# Patient Record
Sex: MALE | Race: BLACK OR AFRICAN AMERICAN | NOT HISPANIC OR LATINO
[De-identification: names, ages, dates, MRNs, and addresses within clinical notes are randomized per-mention and may not be internally consistent; named-entity substitution may affect disease eponyms.]

---

## 2017-03-06 ENCOUNTER — RX RENEWAL (OUTPATIENT)
Age: 52
End: 2017-03-06

## 2017-03-16 ENCOUNTER — APPOINTMENT (OUTPATIENT)
Dept: INTERNAL MEDICINE | Facility: CLINIC | Age: 52
End: 2017-03-16

## 2017-03-16 VITALS — DIASTOLIC BLOOD PRESSURE: 96 MMHG | SYSTOLIC BLOOD PRESSURE: 138 MMHG

## 2017-03-16 VITALS
HEART RATE: 79 BPM | SYSTOLIC BLOOD PRESSURE: 130 MMHG | HEIGHT: 67 IN | BODY MASS INDEX: 28.25 KG/M2 | OXYGEN SATURATION: 98 % | DIASTOLIC BLOOD PRESSURE: 70 MMHG | WEIGHT: 180 LBS

## 2017-03-16 VITALS — SYSTOLIC BLOOD PRESSURE: 132 MMHG | DIASTOLIC BLOOD PRESSURE: 98 MMHG

## 2017-03-16 DIAGNOSIS — A04.8 OTHER SPECIFIED BACTERIAL INTESTINAL INFECTIONS: ICD-10-CM

## 2017-03-16 DIAGNOSIS — S39.012A STRAIN OF MUSCLE, FASCIA AND TENDON OF LOWER BACK, INITIAL ENCOUNTER: ICD-10-CM

## 2017-03-16 DIAGNOSIS — R05 COUGH: ICD-10-CM

## 2017-03-16 DIAGNOSIS — Z87.438 PERSONAL HISTORY OF OTHER DISEASES OF MALE GENITAL ORGANS: ICD-10-CM

## 2017-03-16 DIAGNOSIS — Z87.448 PERSONAL HISTORY OF OTHER DISEASES OF URINARY SYSTEM: ICD-10-CM

## 2017-03-17 LAB
ANION GAP SERPL CALC-SCNC: 16 MMOL/L
BUN SERPL-MCNC: 13 MG/DL
CALCIUM SERPL-MCNC: 9.7 MG/DL
CHLORIDE SERPL-SCNC: 101 MMOL/L
CO2 SERPL-SCNC: 24 MMOL/L
CREAT SERPL-MCNC: 1.1 MG/DL
GLUCOSE SERPL-MCNC: 96 MG/DL
POTASSIUM SERPL-SCNC: 3.8 MMOL/L
SODIUM SERPL-SCNC: 141 MMOL/L

## 2017-04-18 ENCOUNTER — APPOINTMENT (OUTPATIENT)
Dept: INTERNAL MEDICINE | Facility: CLINIC | Age: 52
End: 2017-04-18

## 2017-05-01 ENCOUNTER — APPOINTMENT (OUTPATIENT)
Dept: INTERNAL MEDICINE | Facility: CLINIC | Age: 52
End: 2017-05-01

## 2017-05-01 VITALS
BODY MASS INDEX: 28.25 KG/M2 | OXYGEN SATURATION: 98 % | DIASTOLIC BLOOD PRESSURE: 95 MMHG | HEIGHT: 67 IN | WEIGHT: 180 LBS | SYSTOLIC BLOOD PRESSURE: 130 MMHG | HEART RATE: 72 BPM

## 2017-05-01 VITALS — SYSTOLIC BLOOD PRESSURE: 124 MMHG | DIASTOLIC BLOOD PRESSURE: 86 MMHG

## 2017-06-05 ENCOUNTER — RX RENEWAL (OUTPATIENT)
Age: 52
End: 2017-06-05

## 2017-06-16 ENCOUNTER — APPOINTMENT (OUTPATIENT)
Dept: INTERNAL MEDICINE | Facility: CLINIC | Age: 52
End: 2017-06-16

## 2017-06-16 VITALS
OXYGEN SATURATION: 98 % | DIASTOLIC BLOOD PRESSURE: 100 MMHG | WEIGHT: 178 LBS | BODY MASS INDEX: 27.94 KG/M2 | SYSTOLIC BLOOD PRESSURE: 140 MMHG | HEART RATE: 70 BPM | HEIGHT: 67 IN

## 2017-06-16 VITALS — DIASTOLIC BLOOD PRESSURE: 94 MMHG | SYSTOLIC BLOOD PRESSURE: 120 MMHG

## 2017-06-18 LAB
ANION GAP SERPL CALC-SCNC: 16 MMOL/L
BUN SERPL-MCNC: 15 MG/DL
CALCIUM SERPL-MCNC: 9.7 MG/DL
CHLORIDE SERPL-SCNC: 98 MMOL/L
CO2 SERPL-SCNC: 23 MMOL/L
CREAT SERPL-MCNC: 1.13 MG/DL
GLUCOSE SERPL-MCNC: 89 MG/DL
POTASSIUM SERPL-SCNC: 3.5 MMOL/L
SODIUM SERPL-SCNC: 137 MMOL/L

## 2017-07-11 ENCOUNTER — RX RENEWAL (OUTPATIENT)
Age: 52
End: 2017-07-11

## 2017-07-25 ENCOUNTER — APPOINTMENT (OUTPATIENT)
Dept: INTERNAL MEDICINE | Facility: CLINIC | Age: 52
End: 2017-07-25

## 2017-07-25 VITALS
HEIGHT: 67 IN | WEIGHT: 178 LBS | HEART RATE: 76 BPM | BODY MASS INDEX: 27.94 KG/M2 | SYSTOLIC BLOOD PRESSURE: 120 MMHG | DIASTOLIC BLOOD PRESSURE: 80 MMHG | OXYGEN SATURATION: 98 %

## 2017-07-26 LAB
25(OH)D3 SERPL-MCNC: 37.8 NG/ML
ALBUMIN SERPL ELPH-MCNC: 4.5 G/DL
ALP BLD-CCNC: 74 U/L
ALT SERPL-CCNC: 60 U/L
ANION GAP SERPL CALC-SCNC: 14 MMOL/L
AST SERPL-CCNC: 38 U/L
BASOPHILS # BLD AUTO: 0.01 K/UL
BASOPHILS NFR BLD AUTO: 0.2 %
BILIRUB SERPL-MCNC: 0.3 MG/DL
BUN SERPL-MCNC: 15 MG/DL
CALCIUM SERPL-MCNC: 9.8 MG/DL
CHLORIDE SERPL-SCNC: 100 MMOL/L
CHOLEST SERPL-MCNC: 168 MG/DL
CHOLEST/HDLC SERPL: 3.8 RATIO
CO2 SERPL-SCNC: 26 MMOL/L
CREAT SERPL-MCNC: 1.04 MG/DL
EOSINOPHIL # BLD AUTO: 0.15 K/UL
EOSINOPHIL NFR BLD AUTO: 2.5 %
GLUCOSE SERPL-MCNC: 86 MG/DL
HBA1C MFR BLD HPLC: 5.7 %
HCT VFR BLD CALC: 44.2 %
HDLC SERPL-MCNC: 44 MG/DL
HGB BLD-MCNC: 14.9 G/DL
IMM GRANULOCYTES NFR BLD AUTO: 0.2 %
LDLC SERPL CALC-MCNC: 74 MG/DL
LYMPHOCYTES # BLD AUTO: 1.89 K/UL
LYMPHOCYTES NFR BLD AUTO: 31.1 %
MAN DIFF?: NORMAL
MCHC RBC-ENTMCNC: 30.2 PG
MCHC RBC-ENTMCNC: 33.7 GM/DL
MCV RBC AUTO: 89.5 FL
MONOCYTES # BLD AUTO: 0.72 K/UL
MONOCYTES NFR BLD AUTO: 11.8 %
NEUTROPHILS # BLD AUTO: 3.3 K/UL
NEUTROPHILS NFR BLD AUTO: 54.2 %
PLATELET # BLD AUTO: 233 K/UL
POTASSIUM SERPL-SCNC: 4.1 MMOL/L
PROT SERPL-MCNC: 7.8 G/DL
RBC # BLD: 4.94 M/UL
RBC # FLD: 13.6 %
SODIUM SERPL-SCNC: 140 MMOL/L
TRIGL SERPL-MCNC: 250 MG/DL
WBC # FLD AUTO: 6.08 K/UL

## 2017-08-08 ENCOUNTER — APPOINTMENT (OUTPATIENT)
Dept: PHYSICAL MEDICINE AND REHAB | Facility: CLINIC | Age: 52
End: 2017-08-08
Payer: COMMERCIAL

## 2017-08-08 VITALS
TEMPERATURE: 98 F | HEART RATE: 71 BPM | DIASTOLIC BLOOD PRESSURE: 96 MMHG | OXYGEN SATURATION: 96 % | SYSTOLIC BLOOD PRESSURE: 149 MMHG

## 2017-08-08 PROCEDURE — 99214 OFFICE O/P EST MOD 30 MIN: CPT

## 2017-08-28 ENCOUNTER — RX RENEWAL (OUTPATIENT)
Age: 52
End: 2017-08-28

## 2017-09-19 ENCOUNTER — APPOINTMENT (OUTPATIENT)
Dept: PHYSICAL MEDICINE AND REHAB | Facility: CLINIC | Age: 52
End: 2017-09-19
Payer: COMMERCIAL

## 2017-09-19 VITALS
SYSTOLIC BLOOD PRESSURE: 135 MMHG | DIASTOLIC BLOOD PRESSURE: 89 MMHG | TEMPERATURE: 97.7 F | OXYGEN SATURATION: 98 % | HEART RATE: 64 BPM

## 2017-09-19 PROCEDURE — 99213 OFFICE O/P EST LOW 20 MIN: CPT

## 2017-10-06 ENCOUNTER — APPOINTMENT (OUTPATIENT)
Dept: INTERNAL MEDICINE | Facility: CLINIC | Age: 52
End: 2017-10-06

## 2018-04-27 ENCOUNTER — APPOINTMENT (OUTPATIENT)
Dept: INTERNAL MEDICINE | Facility: CLINIC | Age: 53
End: 2018-04-27
Payer: COMMERCIAL

## 2018-04-27 VITALS — SYSTOLIC BLOOD PRESSURE: 140 MMHG | DIASTOLIC BLOOD PRESSURE: 80 MMHG

## 2018-04-27 VITALS
WEIGHT: 178 LBS | DIASTOLIC BLOOD PRESSURE: 80 MMHG | HEART RATE: 73 BPM | BODY MASS INDEX: 27.94 KG/M2 | SYSTOLIC BLOOD PRESSURE: 120 MMHG | HEIGHT: 67 IN | OXYGEN SATURATION: 98 %

## 2018-04-27 PROCEDURE — 99214 OFFICE O/P EST MOD 30 MIN: CPT

## 2018-04-27 RX ORDER — CYCLOBENZAPRINE HYDROCHLORIDE 10 MG/1
10 TABLET, FILM COATED ORAL
Qty: 10 | Refills: 0 | Status: COMPLETED | COMMUNITY
End: 2017-04-27

## 2018-04-30 LAB
25(OH)D3 SERPL-MCNC: 37.2 NG/ML
ALBUMIN SERPL ELPH-MCNC: 4.3 G/DL
ALP BLD-CCNC: 89 U/L
ALT SERPL-CCNC: 37 U/L
ANION GAP SERPL CALC-SCNC: 16 MMOL/L
AST SERPL-CCNC: 26 U/L
BASOPHILS # BLD AUTO: 0.03 K/UL
BASOPHILS NFR BLD AUTO: 0.4 %
BILIRUB SERPL-MCNC: 0.4 MG/DL
BUN SERPL-MCNC: 13 MG/DL
CALCIUM SERPL-MCNC: 9.9 MG/DL
CHLORIDE SERPL-SCNC: 99 MMOL/L
CHOLEST SERPL-MCNC: 163 MG/DL
CHOLEST/HDLC SERPL: 3.5 RATIO
CO2 SERPL-SCNC: 25 MMOL/L
CREAT SERPL-MCNC: 1.11 MG/DL
EOSINOPHIL # BLD AUTO: 0.13 K/UL
EOSINOPHIL NFR BLD AUTO: 1.7 %
GLUCOSE SERPL-MCNC: 91 MG/DL
HBA1C MFR BLD HPLC: 5.8 %
HCT VFR BLD CALC: 45.5 %
HCV AB SER QL: NONREACTIVE
HCV S/CO RATIO: 0.11 S/CO
HDLC SERPL-MCNC: 46 MG/DL
HGB BLD-MCNC: 15.1 G/DL
IMM GRANULOCYTES NFR BLD AUTO: 0.3 %
LDLC SERPL CALC-MCNC: 96 MG/DL
LYMPHOCYTES # BLD AUTO: 1.75 K/UL
LYMPHOCYTES NFR BLD AUTO: 22.3 %
MAN DIFF?: NORMAL
MCHC RBC-ENTMCNC: 30.1 PG
MCHC RBC-ENTMCNC: 33.2 GM/DL
MCV RBC AUTO: 90.8 FL
MONOCYTES # BLD AUTO: 0.92 K/UL
MONOCYTES NFR BLD AUTO: 11.7 %
NEUTROPHILS # BLD AUTO: 5 K/UL
NEUTROPHILS NFR BLD AUTO: 63.6 %
PLATELET # BLD AUTO: 233 K/UL
POTASSIUM SERPL-SCNC: 3.6 MMOL/L
PROT SERPL-MCNC: 8.1 G/DL
RBC # BLD: 5.01 M/UL
RBC # FLD: 12.7 %
SODIUM SERPL-SCNC: 140 MMOL/L
TRIGL SERPL-MCNC: 105 MG/DL
WBC # FLD AUTO: 7.85 K/UL

## 2018-05-03 ENCOUNTER — APPOINTMENT (OUTPATIENT)
Dept: SURGERY | Facility: CLINIC | Age: 53
End: 2018-05-03

## 2018-06-01 ENCOUNTER — RX RENEWAL (OUTPATIENT)
Age: 53
End: 2018-06-01

## 2018-06-01 ENCOUNTER — TRANSCRIPTION ENCOUNTER (OUTPATIENT)
Age: 53
End: 2018-06-01

## 2018-06-02 ENCOUNTER — EMERGENCY (EMERGENCY)
Facility: HOSPITAL | Age: 53
LOS: 1 days | Discharge: HOME | End: 2018-06-02
Attending: EMERGENCY MEDICINE
Payer: COMMERCIAL

## 2018-06-02 VITALS
TEMPERATURE: 98 F | RESPIRATION RATE: 18 BRPM | HEART RATE: 87 BPM | OXYGEN SATURATION: 96 % | SYSTOLIC BLOOD PRESSURE: 163 MMHG | WEIGHT: 160.06 LBS | DIASTOLIC BLOOD PRESSURE: 124 MMHG

## 2018-06-02 VITALS
DIASTOLIC BLOOD PRESSURE: 88 MMHG | HEART RATE: 88 BPM | SYSTOLIC BLOOD PRESSURE: 144 MMHG | OXYGEN SATURATION: 100 % | RESPIRATION RATE: 19 BRPM | TEMPERATURE: 98 F

## 2018-06-02 LAB
APPEARANCE UR: CLEAR — SIGNIFICANT CHANGE UP
BILIRUB UR-MCNC: NEGATIVE — SIGNIFICANT CHANGE UP
COLOR SPEC: YELLOW — SIGNIFICANT CHANGE UP
DIFF PNL FLD: NEGATIVE — SIGNIFICANT CHANGE UP
GLUCOSE UR QL: NEGATIVE — SIGNIFICANT CHANGE UP
HYALINE CASTS # UR AUTO: ABNORMAL
KETONES UR-MCNC: NEGATIVE — SIGNIFICANT CHANGE UP
LEUKOCYTE ESTERASE UR-ACNC: NEGATIVE — SIGNIFICANT CHANGE UP
NITRITE UR-MCNC: NEGATIVE — SIGNIFICANT CHANGE UP
PH UR: 6 — SIGNIFICANT CHANGE UP (ref 5–8)
PROT UR-MCNC: SIGNIFICANT CHANGE UP
RBC CASTS # UR COMP ASSIST: SIGNIFICANT CHANGE UP /HPF (ref 0–2)
SP GR SPEC: 1.03 — HIGH (ref 1.01–1.02)
UROBILINOGEN FLD QL: NEGATIVE — SIGNIFICANT CHANGE UP
WBC UR QL: SIGNIFICANT CHANGE UP /HPF (ref 0–5)

## 2018-06-02 PROCEDURE — 74176 CT ABD & PELVIS W/O CONTRAST: CPT | Mod: 26

## 2018-06-02 PROCEDURE — 81001 URINALYSIS AUTO W/SCOPE: CPT

## 2018-06-02 PROCEDURE — 99283 EMERGENCY DEPT VISIT LOW MDM: CPT

## 2018-06-02 PROCEDURE — 74176 CT ABD & PELVIS W/O CONTRAST: CPT

## 2018-06-02 RX ORDER — LIDOCAINE 4 G/100G
1 CREAM TOPICAL ONCE
Qty: 0 | Refills: 0 | Status: COMPLETED | OUTPATIENT
Start: 2018-06-02 | End: 2018-06-02

## 2018-06-02 RX ORDER — ACETAMINOPHEN 500 MG
650 TABLET ORAL ONCE
Qty: 0 | Refills: 0 | Status: COMPLETED | OUTPATIENT
Start: 2018-06-02 | End: 2018-06-02

## 2018-06-02 RX ORDER — IBUPROFEN 200 MG
1 TABLET ORAL
Qty: 28 | Refills: 0
Start: 2018-06-02 | End: 2018-06-08

## 2018-06-02 RX ORDER — IBUPROFEN 200 MG
600 TABLET ORAL ONCE
Qty: 0 | Refills: 0 | Status: COMPLETED | OUTPATIENT
Start: 2018-06-02 | End: 2018-06-02

## 2018-06-02 RX ORDER — DIAZEPAM 5 MG
5 TABLET ORAL ONCE
Qty: 0 | Refills: 0 | Status: DISCONTINUED | OUTPATIENT
Start: 2018-06-02 | End: 2018-06-02

## 2018-06-02 RX ORDER — OXYCODONE HYDROCHLORIDE 5 MG/1
1 TABLET ORAL
Qty: 12 | Refills: 0
Start: 2018-06-02 | End: 2018-06-04

## 2018-06-02 RX ORDER — OXYCODONE HYDROCHLORIDE 5 MG/1
5 TABLET ORAL ONCE
Qty: 0 | Refills: 0 | Status: DISCONTINUED | OUTPATIENT
Start: 2018-06-02 | End: 2018-06-02

## 2018-06-02 RX ADMIN — LIDOCAINE 1 PATCH: 4 CREAM TOPICAL at 12:42

## 2018-06-02 RX ADMIN — Medication 600 MILLIGRAM(S): at 12:42

## 2018-06-02 RX ADMIN — Medication 5 MILLIGRAM(S): at 12:42

## 2018-06-02 RX ADMIN — Medication 650 MILLIGRAM(S): at 12:42

## 2018-06-02 RX ADMIN — OXYCODONE HYDROCHLORIDE 5 MILLIGRAM(S): 5 TABLET ORAL at 13:33

## 2018-06-02 NOTE — ED ADULT NURSE NOTE - OBJECTIVE STATEMENT
53 yo male presents in the ed for back pain. Pt is alert and oriented x4, speaking coherently. Pt states that he has had back pain on and off for "years" never officially diagnosed by MRI- pt has had trouble ambulating and getting up out of a chair. Lungs clear to ascultation, abdomen soft, non tender. Pt denies urinary symptoms- MD at bedside, will continue to reassess.

## 2018-06-02 NOTE — ED PROVIDER NOTE - ATTENDING CONTRIBUTION TO CARE
L flank pain after moving.  No numbness / tingling / urinary incont or retention / bowel probs / ivdu / fevers.   no skin changes. no midline ttp. +ttp L flank  given the ttp is lateral to paraspinal this is not kemar typical for msk back pain, low threshold for further imaging.

## 2018-06-02 NOTE — ED PROVIDER NOTE - CARE PLAN
Principal Discharge DX:	Acute left-sided low back pain without sciatica Principal Discharge DX:	Acute left-sided low back pain without sciatica  Assessment and plan of treatment:	1) Follow up with your doctor in 3 days. Call for an appointment.   2) Return to the ER immediately for new or worsening symptoms including fevers, chills vomiting or other issues.   3) Take Ibuprofen 600-800mg every 6 hours as needed for pain.   4) Take oxycodone 5mg every 6 hours as needed for severe pain. Do not drink alcohol and do no drive while taking this medication.

## 2018-06-02 NOTE — ED PROVIDER NOTE - OBJECTIVE STATEMENT
52yoM hx of back pain, pw left sided back pain after twisting wrong 2 days ago, felt tensing in his back, and then severe pain, radiating to his left side and buttock. pain 8/10, waxing and waning, feels stiff like he cannot move. has had this before, feels the same as his normal back pain. denies trauma, iv drug use, back instrumentation, travel, alcohol abuse or other issues.

## 2018-06-02 NOTE — ED PROVIDER NOTE - PLAN OF CARE
1) Follow up with your doctor in 3 days. Call for an appointment.   2) Return to the ER immediately for new or worsening symptoms including fevers, chills vomiting or other issues.   3) Take Ibuprofen 600-800mg every 6 hours as needed for pain.   4) Take oxycodone 5mg every 6 hours as needed for severe pain. Do not drink alcohol and do no drive while taking this medication.

## 2018-06-02 NOTE — ED PROVIDER NOTE - MEDICAL DECISION MAKING DETAILS
52yoM pw left sided back pain, after twisting. CT from 2013 shows normal caliber aorta. no indication to repeat scan at this time. will check urine for blood and give pain meds. if blood in urine, will obtain ct to rule out nephrolithiasis, otherwise will treat as back pain, msk.

## 2018-06-02 NOTE — ED PROVIDER NOTE - PROGRESS NOTE DETAILS
pain not improved, will ct for stone or abdominal pathology. pain now improved. ambulating independently. will send prescription and arrange follow up with ortho and dc home.

## 2018-06-04 ENCOUNTER — APPOINTMENT (OUTPATIENT)
Dept: MRI IMAGING | Facility: IMAGING CENTER | Age: 53
End: 2018-06-04

## 2018-06-04 RX ORDER — METAXALONE 800 MG/1
800 TABLET ORAL EVERY 8 HOURS
Qty: 30 | Refills: 1 | Status: DISCONTINUED | COMMUNITY
Start: 2018-06-01 | End: 2018-06-04

## 2018-06-04 RX ORDER — DICLOFENAC SODIUM 75 MG/1
75 TABLET, DELAYED RELEASE ORAL
Qty: 60 | Refills: 1 | Status: DISCONTINUED | COMMUNITY
Start: 2017-07-11 | End: 2018-06-04

## 2018-06-09 ENCOUNTER — APPOINTMENT (OUTPATIENT)
Dept: MRI IMAGING | Facility: CLINIC | Age: 53
End: 2018-06-09

## 2018-06-19 ENCOUNTER — RX RENEWAL (OUTPATIENT)
Age: 53
End: 2018-06-19

## 2018-07-10 ENCOUNTER — APPOINTMENT (OUTPATIENT)
Dept: PHYSICAL MEDICINE AND REHAB | Facility: CLINIC | Age: 53
End: 2018-07-10
Payer: COMMERCIAL

## 2018-07-10 VITALS — HEART RATE: 76 BPM | SYSTOLIC BLOOD PRESSURE: 175 MMHG | DIASTOLIC BLOOD PRESSURE: 114 MMHG | OXYGEN SATURATION: 99 %

## 2018-07-10 PROCEDURE — 99213 OFFICE O/P EST LOW 20 MIN: CPT

## 2018-07-27 ENCOUNTER — APPOINTMENT (OUTPATIENT)
Dept: INTERNAL MEDICINE | Facility: CLINIC | Age: 53
End: 2018-07-27
Payer: COMMERCIAL

## 2018-07-27 VITALS
SYSTOLIC BLOOD PRESSURE: 160 MMHG | BODY MASS INDEX: 27.62 KG/M2 | OXYGEN SATURATION: 98 % | HEART RATE: 92 BPM | HEIGHT: 67 IN | DIASTOLIC BLOOD PRESSURE: 112 MMHG | WEIGHT: 176 LBS | TEMPERATURE: 97.9 F

## 2018-07-27 VITALS — DIASTOLIC BLOOD PRESSURE: 100 MMHG | SYSTOLIC BLOOD PRESSURE: 150 MMHG

## 2018-07-27 PROCEDURE — 99396 PREV VISIT EST AGE 40-64: CPT

## 2018-07-27 RX ORDER — PREDNISONE 20 MG/1
20 TABLET ORAL
Qty: 20 | Refills: 0 | Status: COMPLETED | COMMUNITY
Start: 2018-07-27 | End: 2018-08-04

## 2018-07-27 NOTE — HEALTH RISK ASSESSMENT
[Good] : ~his/her~ current health as good [Very Good] : ~his/her~  mood as very good [Intercurrent ED visits] : went to ED [No falls in past year] : Patient reported no falls in the past year [Transportation] : transportation [Employed] : employed [High School] : high school [Single] : single [Feels Safe at Home] : Feels safe at home [Fully functional (bathing, dressing, toileting, transferring, walking, feeding)] : Fully functional (bathing, dressing, toileting, transferring, walking, feeding) [Fully functional (using the telephone, shopping, preparing meals, housekeeping, doing laundry, using] : Fully functional and needs no help or supervision to perform IADLs (using the telephone, shopping, preparing meals, housekeeping, doing laundry, using transportation, managing medications and managing finances) [] : No [Reports changes in hearing] : Reports no changes in hearing [Reports changes in vision] : Reports no changes in vision [Reports changes in dental health] : Reports no changes in dental health

## 2018-08-11 ENCOUNTER — RX RENEWAL (OUTPATIENT)
Age: 53
End: 2018-08-11

## 2018-09-04 ENCOUNTER — APPOINTMENT (OUTPATIENT)
Dept: INTERNAL MEDICINE | Facility: CLINIC | Age: 53
End: 2018-09-04
Payer: COMMERCIAL

## 2018-09-04 VITALS — SYSTOLIC BLOOD PRESSURE: 140 MMHG | DIASTOLIC BLOOD PRESSURE: 94 MMHG

## 2018-09-04 VITALS
WEIGHT: 178 LBS | HEIGHT: 67 IN | OXYGEN SATURATION: 98 % | BODY MASS INDEX: 27.94 KG/M2 | TEMPERATURE: 98.2 F | HEART RATE: 90 BPM | SYSTOLIC BLOOD PRESSURE: 132 MMHG | DIASTOLIC BLOOD PRESSURE: 94 MMHG

## 2018-09-04 PROCEDURE — 99214 OFFICE O/P EST MOD 30 MIN: CPT

## 2018-09-04 RX ORDER — CYCLOBENZAPRINE HYDROCHLORIDE 10 MG/1
10 TABLET, FILM COATED ORAL 3 TIMES DAILY
Qty: 30 | Refills: 3 | Status: COMPLETED | COMMUNITY
Start: 2018-06-04 | End: 2018-07-31

## 2018-09-04 NOTE — HISTORY OF PRESENT ILLNESS
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 Assessed by [facility] may only do Light Duty Work and sent for physical therapy\par \par Stopped the HCTZ on his own [id number] when I added the amlodipine\par Getting PT and still working\par Disc herniation seen on MRI  About to do disability online\par \par The meloxicam and cyclobenzaprine did not helpo. Finally the Prednisone helped decrease his back pain\par \par L arm tingling last 2 days

## 2018-09-04 NOTE — REVIEW OF SYSTEMS
[Fever] : no fever [Chills] : no chills [Chest Pain] : no chest pain [Palpitations] : no palpitations [Cough] : no cough

## 2018-10-18 ENCOUNTER — RX RENEWAL (OUTPATIENT)
Age: 53
End: 2018-10-18

## 2018-12-04 ENCOUNTER — APPOINTMENT (OUTPATIENT)
Dept: INTERNAL MEDICINE | Facility: CLINIC | Age: 53
End: 2018-12-04

## 2019-01-17 ENCOUNTER — RX RENEWAL (OUTPATIENT)
Age: 54
End: 2019-01-17

## 2019-02-04 ENCOUNTER — APPOINTMENT (OUTPATIENT)
Dept: INTERNAL MEDICINE | Facility: CLINIC | Age: 54
End: 2019-02-04
Payer: COMMERCIAL

## 2019-02-04 VITALS
OXYGEN SATURATION: 97 % | WEIGHT: 177 LBS | DIASTOLIC BLOOD PRESSURE: 80 MMHG | TEMPERATURE: 97.7 F | HEIGHT: 67 IN | BODY MASS INDEX: 27.78 KG/M2 | SYSTOLIC BLOOD PRESSURE: 141 MMHG | HEART RATE: 88 BPM

## 2019-02-04 VITALS — SYSTOLIC BLOOD PRESSURE: 122 MMHG | DIASTOLIC BLOOD PRESSURE: 86 MMHG

## 2019-02-04 DIAGNOSIS — Z87.2 PERSONAL HISTORY OF DISEASES OF THE SKIN AND SUBCUTANEOUS TISSUE: ICD-10-CM

## 2019-02-04 DIAGNOSIS — S39.012D STRAIN OF MUSCLE, FASCIA AND TENDON OF LOWER BACK, SUBSEQUENT ENCOUNTER: ICD-10-CM

## 2019-02-04 DIAGNOSIS — M62.830 MUSCLE SPASM OF BACK: ICD-10-CM

## 2019-02-04 DIAGNOSIS — Z87.898 PERSONAL HISTORY OF OTHER SPECIFIED CONDITIONS: ICD-10-CM

## 2019-02-04 PROCEDURE — 99214 OFFICE O/P EST MOD 30 MIN: CPT

## 2019-02-04 NOTE — HISTORY OF PRESENT ILLNESS
[FreeTextEntry1] : Back Pain  HTN [de-identified] : 52 yo B male HTN\par \par Herniated disc finally helped by prednisone\par Sent to [facility]\par Stopped HCTZ in the past\par Takes a bus and walks up the 1/4+mile steep hill to get here\par \par HA with felodipine 5mg\par Took meloxicam today

## 2019-02-05 LAB
ANION GAP SERPL CALC-SCNC: 13 MMOL/L
BUN SERPL-MCNC: 14 MG/DL
CALCIUM SERPL-MCNC: 9.6 MG/DL
CHLORIDE SERPL-SCNC: 102 MMOL/L
CO2 SERPL-SCNC: 28 MMOL/L
CREAT SERPL-MCNC: 1.13 MG/DL
CREAT SPEC-SCNC: 159 MG/DL
CREAT/PROT UR: 0.1 RATIO
GLUCOSE SERPL-MCNC: 102 MG/DL
POTASSIUM SERPL-SCNC: 3.5 MMOL/L
PROT UR-MCNC: 15 MG/DL
SODIUM SERPL-SCNC: 143 MMOL/L

## 2019-02-13 ENCOUNTER — TRANSCRIPTION ENCOUNTER (OUTPATIENT)
Age: 54
End: 2019-02-13

## 2019-03-09 ENCOUNTER — TRANSCRIPTION ENCOUNTER (OUTPATIENT)
Age: 54
End: 2019-03-09

## 2019-03-21 ENCOUNTER — TRANSCRIPTION ENCOUNTER (OUTPATIENT)
Age: 54
End: 2019-03-21

## 2019-03-22 ENCOUNTER — TRANSCRIPTION ENCOUNTER (OUTPATIENT)
Age: 54
End: 2019-03-22

## 2019-03-25 ENCOUNTER — APPOINTMENT (OUTPATIENT)
Dept: INTERNAL MEDICINE | Facility: CLINIC | Age: 54
End: 2019-03-25
Payer: COMMERCIAL

## 2019-03-25 VITALS
SYSTOLIC BLOOD PRESSURE: 138 MMHG | TEMPERATURE: 97.7 F | OXYGEN SATURATION: 97 % | HEIGHT: 67 IN | WEIGHT: 178 LBS | HEART RATE: 99 BPM | DIASTOLIC BLOOD PRESSURE: 76 MMHG | BODY MASS INDEX: 27.94 KG/M2

## 2019-03-25 VITALS — DIASTOLIC BLOOD PRESSURE: 74 MMHG | SYSTOLIC BLOOD PRESSURE: 112 MMHG

## 2019-03-25 PROCEDURE — 99214 OFFICE O/P EST MOD 30 MIN: CPT

## 2019-03-25 NOTE — HISTORY OF PRESENT ILLNESS
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 In the future will have scanned instead of lifting records. Will be out [id number] to [id number] to avoid lifting. Working at this point\par \par HA with 5mg felodipine  so 2.5mg amlodipine BID

## 2019-04-30 ENCOUNTER — RX RENEWAL (OUTPATIENT)
Age: 54
End: 2019-04-30

## 2019-05-13 ENCOUNTER — TRANSCRIPTION ENCOUNTER (OUTPATIENT)
Age: 54
End: 2019-05-13

## 2019-05-28 ENCOUNTER — RX RENEWAL (OUTPATIENT)
Age: 54
End: 2019-05-28

## 2019-05-28 ENCOUNTER — TRANSCRIPTION ENCOUNTER (OUTPATIENT)
Age: 54
End: 2019-05-28

## 2019-06-06 ENCOUNTER — TRANSCRIPTION ENCOUNTER (OUTPATIENT)
Age: 54
End: 2019-06-06

## 2019-06-10 ENCOUNTER — TRANSCRIPTION ENCOUNTER (OUTPATIENT)
Age: 54
End: 2019-06-10

## 2019-07-29 ENCOUNTER — APPOINTMENT (OUTPATIENT)
Dept: INTERNAL MEDICINE | Facility: CLINIC | Age: 54
End: 2019-07-29
Payer: COMMERCIAL

## 2019-07-29 VITALS
DIASTOLIC BLOOD PRESSURE: 80 MMHG | OXYGEN SATURATION: 98 % | HEIGHT: 67 IN | BODY MASS INDEX: 28.41 KG/M2 | WEIGHT: 181 LBS | SYSTOLIC BLOOD PRESSURE: 130 MMHG | HEART RATE: 83 BPM | TEMPERATURE: 98.2 F

## 2019-07-29 VITALS — DIASTOLIC BLOOD PRESSURE: 80 MMHG | SYSTOLIC BLOOD PRESSURE: 122 MMHG

## 2019-07-29 PROCEDURE — 99214 OFFICE O/P EST MOD 30 MIN: CPT | Mod: 25

## 2019-07-29 PROCEDURE — 99396 PREV VISIT EST AGE 40-64: CPT

## 2019-07-29 RX ORDER — AMLODIPINE BESYLATE 2.5 MG/1
2.5 TABLET ORAL
Qty: 90 | Refills: 3 | Status: COMPLETED | COMMUNITY
Start: 2018-07-27 | End: 2019-07-29

## 2019-07-29 RX ORDER — CYCLOBENZAPRINE HYDROCHLORIDE 10 MG/1
10 TABLET, FILM COATED ORAL
Qty: 6 | Refills: 0 | Status: COMPLETED | COMMUNITY
Start: 2019-07-29 | End: 2019-08-04

## 2019-07-29 NOTE — PHYSICAL EXAM
[No Acute Distress] : no acute distress [Well Nourished] : well nourished [Well-Appearing] : well-appearing [Well Developed] : well developed [Normal Sclera/Conjunctiva] : normal sclera/conjunctiva [EOMI] : extraocular movements intact [PERRL] : pupils equal round and reactive to light [Normal Outer Ear/Nose] : the outer ears and nose were normal in appearance [Normal Oropharynx] : the oropharynx was normal [No JVD] : no jugular venous distention [Supple] : supple [No Lymphadenopathy] : no lymphadenopathy [Thyroid Normal, No Nodules] : the thyroid was normal and there were no nodules present [No Respiratory Distress] : no respiratory distress  [Clear to Auscultation] : lungs were clear to auscultation bilaterally [No Accessory Muscle Use] : no accessory muscle use [Normal Rate] : normal rate  [Regular Rhythm] : with a regular rhythm [Normal S1, S2] : normal S1 and S2 [No Murmur] : no murmur heard [No Carotid Bruits] : no carotid bruits [No Abdominal Bruit] : a ~M bruit was not heard ~T in the abdomen [No Varicosities] : no varicosities [Pedal Pulses Present] : the pedal pulses are present [No Edema] : there was no peripheral edema [No Extremity Clubbing/Cyanosis] : no extremity clubbing/cyanosis [No Palpable Aorta] : no palpable aorta [Soft] : abdomen soft [Non Tender] : non-tender [Non-distended] : non-distended [No Masses] : no abdominal mass palpated [No HSM] : no HSM [Normal Bowel Sounds] : normal bowel sounds [Normal Posterior Cervical Nodes] : no posterior cervical lymphadenopathy [Normal Anterior Cervical Nodes] : no anterior cervical lymphadenopathy [No CVA Tenderness] : no CVA  tenderness [No Spinal Tenderness] : no spinal tenderness [No Joint Swelling] : no joint swelling [Grossly Normal Strength/Tone] : grossly normal strength/tone [No Rash] : no rash [Normal Gait] : normal gait [Coordination Grossly Intact] : coordination grossly intact [No Focal Deficits] : no focal deficits [Deep Tendon Reflexes (DTR)] : deep tendon reflexes were 2+ and symmetric [Normal Affect] : the affect was normal [Normal Insight/Judgement] : insight and judgment were intact

## 2019-07-30 LAB
ANION GAP SERPL CALC-SCNC: 15 MMOL/L
BUN SERPL-MCNC: 13 MG/DL
CALCIUM SERPL-MCNC: 9.8 MG/DL
CHLORIDE SERPL-SCNC: 99 MMOL/L
CHOLEST SERPL-MCNC: 172 MG/DL
CHOLEST/HDLC SERPL: 3.8 RATIO
CO2 SERPL-SCNC: 28 MMOL/L
CREAT SERPL-MCNC: 1.29 MG/DL
GLUCOSE SERPL-MCNC: 72 MG/DL
HDLC SERPL-MCNC: 45 MG/DL
HIV1+2 AB SPEC QL IA.RAPID: NONREACTIVE
LDLC SERPL CALC-MCNC: 89 MG/DL
POTASSIUM SERPL-SCNC: 3.2 MMOL/L
SODIUM SERPL-SCNC: 142 MMOL/L
TRIGL SERPL-MCNC: 189 MG/DL

## 2019-07-31 ENCOUNTER — TRANSCRIPTION ENCOUNTER (OUTPATIENT)
Age: 54
End: 2019-07-31

## 2019-08-09 DIAGNOSIS — S29.012A STRAIN OF MUSCLE AND TENDON OF BACK WALL OF THORAX, INITIAL ENCOUNTER: ICD-10-CM

## 2019-08-09 DIAGNOSIS — M54.32 SCIATICA, LEFT SIDE: ICD-10-CM

## 2019-08-19 ENCOUNTER — APPOINTMENT (OUTPATIENT)
Dept: PHYSICAL MEDICINE AND REHAB | Facility: CLINIC | Age: 54
End: 2019-08-19
Payer: COMMERCIAL

## 2019-08-19 VITALS
OXYGEN SATURATION: 98 % | SYSTOLIC BLOOD PRESSURE: 133 MMHG | HEART RATE: 68 BPM | TEMPERATURE: 98.4 F | DIASTOLIC BLOOD PRESSURE: 90 MMHG

## 2019-08-19 PROCEDURE — 99214 OFFICE O/P EST MOD 30 MIN: CPT

## 2019-08-19 NOTE — HISTORY OF PRESENT ILLNESS
[FreeTextEntry1] : Patient has not been seen in a year.\iman Still with chronic throbbing low back pain- goes across the back. \par Gets throbbing into the left thigh. \iman Takes meloxicam occasionally if pain is bad and started cyclobenzaprine which helps the throbbing. \iman Had PT most recently a few months ago. \iman Has been on short-term disability for the past three months due his severe low back pain.

## 2019-08-19 NOTE — ASSESSMENT
[FreeTextEntry1] : - Referred to PT\par - Refer to Interventional spine specialist\par - Discussed with patient red flags such as bladder/bowel incontinence, weakness, significant increase in pain level in which case patient should immediately present to Emergency Room.\par - Continue meloxicam and cyclobenzaprine.

## 2019-08-19 NOTE — PHYSICAL EXAM
[Normal] : Normal skin color and pigmentation, normal turgor and no rash [de-identified] : Pain extension of lumbar spine.  [de-identified] : 1+ L patella, R patella and bl achilles 2+, neg SLR, motor 5/5 bl LEs [de-identified] : decreased extension of lumbar spine.

## 2019-09-23 ENCOUNTER — RX RENEWAL (OUTPATIENT)
Age: 54
End: 2019-09-23

## 2019-09-23 ENCOUNTER — TRANSCRIPTION ENCOUNTER (OUTPATIENT)
Age: 54
End: 2019-09-23

## 2019-09-23 RX ORDER — OXYCODONE 5 MG/1
5 TABLET ORAL 3 TIMES DAILY
Qty: 30 | Refills: 0 | Status: COMPLETED | COMMUNITY
Start: 2019-09-23 | End: 2019-10-03

## 2019-09-23 RX ORDER — OXYCODONE 5 MG/1
5 TABLET ORAL 3 TIMES DAILY
Qty: 30 | Refills: 0 | Status: COMPLETED | COMMUNITY
Start: 2019-09-23 | End: 2019-09-23

## 2019-09-26 ENCOUNTER — EMERGENCY (EMERGENCY)
Facility: HOSPITAL | Age: 54
LOS: 1 days | Discharge: HOME | End: 2019-09-26
Attending: EMERGENCY MEDICINE
Payer: COMMERCIAL

## 2019-09-26 VITALS
TEMPERATURE: 98 F | RESPIRATION RATE: 16 BRPM | SYSTOLIC BLOOD PRESSURE: 136 MMHG | HEART RATE: 87 BPM | DIASTOLIC BLOOD PRESSURE: 87 MMHG | OXYGEN SATURATION: 98 %

## 2019-09-26 VITALS
SYSTOLIC BLOOD PRESSURE: 125 MMHG | TEMPERATURE: 98 F | HEIGHT: 67 IN | WEIGHT: 160.06 LBS | OXYGEN SATURATION: 97 % | HEART RATE: 120 BPM | DIASTOLIC BLOOD PRESSURE: 83 MMHG | RESPIRATION RATE: 17 BRPM

## 2019-09-26 LAB
APPEARANCE UR: CLEAR — SIGNIFICANT CHANGE UP
BACTERIA # UR AUTO: NEGATIVE — SIGNIFICANT CHANGE UP
BILIRUB UR-MCNC: NEGATIVE — SIGNIFICANT CHANGE UP
COLOR SPEC: YELLOW — SIGNIFICANT CHANGE UP
DIFF PNL FLD: NEGATIVE — SIGNIFICANT CHANGE UP
EPI CELLS # UR: 0 /HPF — SIGNIFICANT CHANGE UP
GLUCOSE UR QL: NEGATIVE — SIGNIFICANT CHANGE UP
HYALINE CASTS # UR AUTO: 3 /LPF — HIGH (ref 0–2)
KETONES UR-MCNC: NEGATIVE — SIGNIFICANT CHANGE UP
LEUKOCYTE ESTERASE UR-ACNC: NEGATIVE — SIGNIFICANT CHANGE UP
NITRITE UR-MCNC: NEGATIVE — SIGNIFICANT CHANGE UP
PH UR: 6 — SIGNIFICANT CHANGE UP (ref 5–8)
PROT UR-MCNC: ABNORMAL
RBC CASTS # UR COMP ASSIST: 1 /HPF — SIGNIFICANT CHANGE UP (ref 0–4)
SP GR SPEC: 1.02 — SIGNIFICANT CHANGE UP (ref 1.01–1.02)
UROBILINOGEN FLD QL: NEGATIVE — SIGNIFICANT CHANGE UP
WBC UR QL: 3 /HPF — SIGNIFICANT CHANGE UP (ref 0–5)

## 2019-09-26 PROCEDURE — 99283 EMERGENCY DEPT VISIT LOW MDM: CPT

## 2019-09-26 PROCEDURE — 87086 URINE CULTURE/COLONY COUNT: CPT

## 2019-09-26 PROCEDURE — 81001 URINALYSIS AUTO W/SCOPE: CPT

## 2019-09-26 RX ORDER — ACETAMINOPHEN 500 MG
650 TABLET ORAL ONCE
Refills: 0 | Status: COMPLETED | OUTPATIENT
Start: 2019-09-26 | End: 2019-09-26

## 2019-09-26 RX ORDER — OXYCODONE HYDROCHLORIDE 5 MG/1
5 TABLET ORAL ONCE
Refills: 0 | Status: DISCONTINUED | OUTPATIENT
Start: 2019-09-26 | End: 2019-09-26

## 2019-09-26 RX ORDER — DIAZEPAM 5 MG
5 TABLET ORAL ONCE
Refills: 0 | Status: DISCONTINUED | OUTPATIENT
Start: 2019-09-26 | End: 2019-09-26

## 2019-09-26 RX ADMIN — Medication 5 MILLIGRAM(S): at 10:38

## 2019-09-26 RX ADMIN — Medication 650 MILLIGRAM(S): at 10:37

## 2019-09-26 RX ADMIN — Medication 60 MILLIGRAM(S): at 10:37

## 2019-09-26 RX ADMIN — Medication 650 MILLIGRAM(S): at 11:10

## 2019-09-26 RX ADMIN — OXYCODONE HYDROCHLORIDE 5 MILLIGRAM(S): 5 TABLET ORAL at 12:50

## 2019-09-26 RX ADMIN — OXYCODONE HYDROCHLORIDE 5 MILLIGRAM(S): 5 TABLET ORAL at 12:00

## 2019-09-26 NOTE — ED PROVIDER NOTE - NS ED ATTENDING STATEMENT MOD
I have personally seen and examined this patient. I have fully participated in the care of this patient. I have reviewed all pertinent clinical information, including history, physical exam, plan and the Medical Student's note and agree except as noted. I have personally seen and examined this patient.  I have fully participated in the care of this patient. I have reviewed all pertinent clinical information, including history, physical exam, plan and the Medical Student and Resident’s note and agree except as noted.

## 2019-09-26 NOTE — ED ADULT NURSE NOTE - OBJECTIVE STATEMENT
53 y/o male presenting with lower back pain that started on Friday. Back pain is worst in the right flank/hip but patient also feels pain in left flank. Pain is described as "sharp, jolting" that is non-radiating. Pain gets worse all of the sudden without movement but also increases in severity with movement. Patient denies any trauma or physical activity during onset of pain. He presented to an urgent care on Saturday where he was given cyclobenzaprine (last taken today at 7:00am) but denies any improvement of symptoms. Patient denies fever/chills, n/v, paresthesias, changes in sensation, saddle anesthesia, changes in urination, or changes in bowel movements. 55 y/o male seen in [facility] c/o non radiating lower back pain that started on Friday.  Pt states pain is worst in the right flank/hip but he also feels pain in left flank.  Pain is described as "sharp, jolting".  Pain gets worse all of the sudden without movement but also increases in severity with movement. Patient denies fall, trauma, heavy lifting, and strenuous physical activity.  Pt went to urgent care on Saturday and was given cyclobenzaprine.  Patient denies fever/chills, numbness, tingling, nausea, vomiting, changes in sensation, bowel/ bladder incontinence.  Right hip and flank tenderness, pain worse with movement.  Sensation intact.

## 2019-09-26 NOTE — ED PROVIDER NOTE - DR NAME
From: Jose Spencer  To: Brodie Sharma MD  Sent: 1/27/2018 8:56 PM CST  Subject: Diet    What is considered bland food? Also I've located my immunization records, I'll drop them off as soon as I can.
Dr. Gutierrez

## 2019-09-26 NOTE — ED PROVIDER NOTE - CLINICAL SUMMARY MEDICAL DECISION MAKING FREE TEXT BOX
55 y/o male with right lower back and hip pain with sudden onset without precipitating factor. Patient's history of herniated disk and muscle spasms indicative of musculoskeletal etiology. Nephrolithiasis unlikely due to positional nature of pain but UA will be taken to r/o. Pain management with prednisone and tylenol and reassess. 53 y/o male with right lower back and hip pain with sudden onset without precipitating factor. Patient's history of herniated disk and muscle spasms indicative of musculoskeletal etiology. Nephrolithiasis unlikely due to positional nature of pain but UA will be taken to r/o. Pain management with prednisone, valium, Tylenol and reassess. 53 y/o male with right lower back and hip pain with sudden onset without precipitating factor. Patient's history of herniated disk and muscle spasms indicative of musculoskeletal etiology. Nephrolithiasis unlikely due to positional nature of pain but UA will be taken to r/o. Pain management with prednisone, valium, Tylenol and reassess.  eren -pt acute pain in rt lower back exac w movt, jose manuelley muscular, no radicular findings, no numbnes sor weakness, no fever, no constitutional s/s, no bowel or bladder denies hematuria or dysuria - renal colic unlikley as pain is clearly reproduced w movt -- will give antiinflam , muscle relaxer, and analgesia , ck ua and reeval 55 y/o male with right lower back and hip pain with sudden onset without precipitating factor. Patient's history of herniated disk and muscle spasms indicative of musculoskeletal etiology. Nephrolithiasis unlikely due to positional nature of pain but UA will be taken to r/o. Pain management with prednisone, valium, Tylenol and reassess.  eren -pt acute pain in rt lower back exac w movt, likely muscular, no radicular findings, no numbness or weakness, no fever, no constitutional s/s, no bowel or bladder denies hematuria or dysuria - renal colic unlikely as pain is clearly reproduced w movt -- will give antiinflam , muscle relaxer, and analgesia , ck ua and reeval

## 2019-09-26 NOTE — ED ADULT TRIAGE NOTE - CHIEF COMPLAINT QUOTE
lower back pain worsening on the left side, was seen at urgent care and given medication which isnt helping lower back pain worsening on the left side, was seen at urgent care and given medication which isnt helping  hx of chronic back pain

## 2019-09-26 NOTE — ED PROVIDER NOTE - ATTENDING CONTRIBUTION TO CARE
I performed a history and physical exam of the patient and discussed their management with the studentt. I reviewed the student's note and agree with the documented findings and plan of care. My medical decison making and observations are found above. I performed a history and physical exam of the patient and discussed their management with the student and residentt. I reviewed the student' and residents note and agree with the documented findings and plan of care. My medical decison making and observations are found above.

## 2019-09-26 NOTE — ED PROVIDER NOTE - NSFOLLOWUPINSTRUCTIONS_ED_ALL_ED_FT
Please rest your back.  Please take Tylenol as needed for pain.  Please continue with your Flexeril.  A Medrol Dose Pack was sent to your pharmacy. Please begin it tomorrow.  Please follow up with your spine doctor and physical therapy.  Please return to the emergency department should your symptoms worsen.

## 2019-09-26 NOTE — ED PROVIDER NOTE - PATIENT PORTAL LINK FT
You can access the FollowMyHealth Patient Portal offered by [facility] by registering at the following website: [email or web address]. By joining [facility]’s FollowMyHealth portal, you will also be able to view your health information using other applications (apps) compatible with our system.

## 2019-09-26 NOTE — ED ADULT NURSE NOTE - NSIMPLEMENTINTERV_GEN_ALL_ED
Implemented All Universal Safety Interventions:  [place] to call system. Call bell, personal items and telephone within reach. Instruct patient to call for assistance. Room bathroom lighting operational. Non-slip footwear when patient is off stretcher. Physically safe environment: no spills, clutter or unnecessary equipment. Stretcher in lowest position, wheels locked, appropriate side rails in place.

## 2019-09-26 NOTE — ED PROVIDER NOTE - PHYSICAL EXAMINATION
eren - no hip pain, antalgic gait from of hip no shortening or rotation , slt pos on rt , pos jaziel test , no saddle anesthesia strength 5/5 le bl

## 2019-09-26 NOTE — ED PROVIDER NOTE - OBJECTIVE STATEMENT
Mr. Melgar is a 53 y/o male presenting with lower back pain that started on Friday. Back pain is worst in the right flank/hip but patient also feels pain in left flank. Pain is described as "sharp, jolting" that is non-radiating. Pain gets worse all of the sudden without movement but also increases in severity with movement. Patient denies any trauma or physical activity during onset of pain. He presented to an urgent care on Saturday where he was given cyclobenzaprine (last taken today at 7:00am) but denies any improvement of symptoms. Patient denies fever/chills, n/v, paresthesias, changes in sensation, saddle anesthesia, changes in urination, or changes in bowel movements. Mr. Melgar is a 55 y/o male presenting with lower back pain that started on Friday. Back pain is worst in the right flank/hip but patient also feels pain in left flank. Pain is described as "sharp, jolting" that is non-radiating. Pain gets worse all of the sudden without movement but also increases in severity with movement. Patient denies any trauma or physical activity during onset of pain. He presented to an urgent care on Saturday where he was given cyclobenzaprine (last taken today at 7:00am) but denies any improvement of symptoms. Patient denies fever/chills, n/v, paresthesias, changes in sensation, saddle anesthesia, changes in urination, or changes in bowel movements.    eren - pain worse w movt, pt denies abd pain, no freq urgency or dysuria

## 2019-09-26 NOTE — ED ADULT NURSE NOTE - CHIEF COMPLAINT QUOTE
lower back pain worsening on the left side, was seen at urgent care and given medication which isnt helping  hx of chronic back pain

## 2019-09-26 NOTE — ED PROVIDER NOTE - PROGRESS NOTE DETAILS
pt ambulatory to bathroom in ed mild improvement of pain repeat hr 87 pt with some relief still pain w movt - pt endorses prev similar pain, is currently having physical therapy for herniated disc - may have been aggravated at that point - wants to go home with fu with his prvt back doc

## 2019-09-26 NOTE — ED PROVIDER NOTE - MUSCULOSKELETAL MINIMAL EXAM
Right hip and flank tenderness; pain worse with passive left leg raise to 45 degrees, no paraspinal or spinal tenderness/RANGE OF MOTION LIMITED/TENDERNESS/atraumatic/motor intact/MUSCLE SPASMS Right hip nontender to palpation and mild flank and paravertebral  flank tenderness; pain worse with passive left leg raise to 45 degrees,  spinal tenderness/TENDERNESS/motor intact/MUSCLE SPASMS/atraumatic/RANGE OF MOTION LIMITED

## 2019-09-27 LAB
CULTURE RESULTS: SIGNIFICANT CHANGE UP
SPECIMEN SOURCE: SIGNIFICANT CHANGE UP

## 2019-10-01 ENCOUNTER — TRANSCRIPTION ENCOUNTER (OUTPATIENT)
Age: 54
End: 2019-10-01

## 2019-10-02 RX ORDER — OXYCODONE 5 MG/1
5 TABLET ORAL 3 TIMES DAILY
Qty: 30 | Refills: 0 | Status: COMPLETED | COMMUNITY
Start: 2019-09-23 | End: 2019-10-12

## 2019-10-09 ENCOUNTER — TRANSCRIPTION ENCOUNTER (OUTPATIENT)
Age: 54
End: 2019-10-09

## 2019-10-14 ENCOUNTER — APPOINTMENT (OUTPATIENT)
Dept: PHYSICAL MEDICINE AND REHAB | Facility: CLINIC | Age: 54
End: 2019-10-14
Payer: COMMERCIAL

## 2019-10-14 VITALS — SYSTOLIC BLOOD PRESSURE: 133 MMHG | DIASTOLIC BLOOD PRESSURE: 87 MMHG | HEART RATE: 76 BPM

## 2019-10-14 PROBLEM — M51.26 OTHER INTERVERTEBRAL DISC DISPLACEMENT, LUMBAR REGION: Chronic | Status: ACTIVE | Noted: 2019-09-26

## 2019-10-14 PROCEDURE — 99213 OFFICE O/P EST LOW 20 MIN: CPT

## 2019-10-14 NOTE — ASSESSMENT
[FreeTextEntry1] : - Referred to PT\par - MRI of lumbar spine to r/o herniation\par - Refer to Interventional spine specialist\par - Discussed with patient red flags such as bladder/bowel incontinence, weakness, significant increase in pain level in which case patient should immediately present to Emergency Room.\par - Continue meloxicam daily and cyclobenzaprine prn

## 2019-10-14 NOTE — HISTORY OF PRESENT ILLNESS
[FreeTextEntry1] : Patient had episode of severe pain last month - went urgent care and then ED and was given oxycodone and muscle relaxer and went. Has not been back to therapy since and has been staying.\par Has not had an MRI of the lumbar spine in over a year at which time he was found to have disc herniation. Pain is primarily in the low back. \par Still our on disability. \par No B/B incontinence.\par No fever/chills, no weight changes. \par Pain is typically 1-2/10 but when has "episodes" like a few weeks ago he is 10/10 in pain for a week or two.

## 2019-10-14 NOTE — PHYSICAL EXAM
[Normal] : Oriented to person, place, and time, insight and judgement were intact and the affect was normal [de-identified] : no resp distress [de-identified] : nml DPP [de-identified] : Pain extension of lumbar spine. Full ROM lumbar spine w discomfort w end extension.  [de-identified] : soft [de-identified] : decreased extension of lumbar spine.  [de-identified] : 1+ L patella, R patella and bl achilles 2+, neg SLR, motor 5/5 bl LEs

## 2019-10-15 ENCOUNTER — TRANSCRIPTION ENCOUNTER (OUTPATIENT)
Age: 54
End: 2019-10-15

## 2019-10-25 ENCOUNTER — APPOINTMENT (OUTPATIENT)
Dept: PHYSICAL MEDICINE AND REHAB | Facility: CLINIC | Age: 54
End: 2019-10-25
Payer: COMMERCIAL

## 2019-10-25 VITALS
HEIGHT: 67 IN | DIASTOLIC BLOOD PRESSURE: 96 MMHG | SYSTOLIC BLOOD PRESSURE: 143 MMHG | OXYGEN SATURATION: 99 % | WEIGHT: 181 LBS | BODY MASS INDEX: 28.41 KG/M2 | TEMPERATURE: 97.7 F | HEART RATE: 91 BPM

## 2019-10-25 PROCEDURE — 99214 OFFICE O/P EST MOD 30 MIN: CPT

## 2019-10-28 ENCOUNTER — TRANSCRIPTION ENCOUNTER (OUTPATIENT)
Age: 54
End: 2019-10-28

## 2019-10-28 NOTE — PHYSICAL EXAM
[Normal] : Oriented to person, place, and time, insight and judgement were intact and the affect was normal [FreeTextEntry1] : No gross deformity, TTP of the lumbar paraspinals: bilaterally, ROM limited with flexion/extension/oblique extension/all planes, SLR negative  [de-identified] : No gross deformity, TTP of the lumbar paraspinals: bilaterally, ROM limited with extension and oblique extension bilaterally, SLR negative\par

## 2019-10-28 NOTE — HISTORY OF PRESENT ILLNESS
[FreeTextEntry1] : Location: low back pain\par Quality: aching pain\par Duration: 3-4 years\par Frequency: intermittent\par Alleviating Factors: laying flat\par Aggravating Factors: prolonged standing, walking\par Conservative treatment tried: PT and oral NSAIDs \par Associated Symptoms: denies\par Prior Studies: MRI of L spine, multi level degenerative changes seen along with facet arthropathy at L4/5, and L5/S1 \par

## 2019-10-28 NOTE — ASSESSMENT
[FreeTextEntry1] : Medical necessity: Lumbar MBB injection is medically necessary given the persistent and severe low back pain for over 3 months. Patient rates pain as 7-10/10. MRI scan of the lumbar spine which showed degenerative changes at L4/5 ,L5/S1. The symptom greatly affect patient's daily life and function. Patient's symptom and physical examination consist to the lumbar spondylosis. Patient failed treatment includes NSAIDs, physical therapy.\par \par Schedule for bilateral L3/4/5 MBB

## 2019-11-04 ENCOUNTER — TRANSCRIPTION ENCOUNTER (OUTPATIENT)
Age: 54
End: 2019-11-04

## 2019-11-12 ENCOUNTER — APPOINTMENT (OUTPATIENT)
Dept: PHYSICAL MEDICINE AND REHAB | Facility: CLINIC | Age: 54
End: 2019-11-12

## 2019-11-12 ENCOUNTER — OUTPATIENT (OUTPATIENT)
Dept: OUTPATIENT SERVICES | Facility: HOSPITAL | Age: 54
LOS: 1 days | End: 2019-11-12
Payer: COMMERCIAL

## 2019-11-12 DIAGNOSIS — M54.16 RADICULOPATHY, LUMBAR REGION: ICD-10-CM

## 2019-11-12 PROCEDURE — 64494 INJ PARAVERT F JNT L/S 2 LEV: CPT | Mod: 50

## 2019-11-12 PROCEDURE — 64494 INJ PARAVERT F JNT L/S 2 LEV: CPT

## 2019-11-12 PROCEDURE — 64493 INJ PARAVERT F JNT L/S 1 LEV: CPT | Mod: 50

## 2019-11-12 PROCEDURE — 64493 INJ PARAVERT F JNT L/S 1 LEV: CPT

## 2019-11-19 DIAGNOSIS — M47.816 SPONDYLOSIS WITHOUT MYELOPATHY OR RADICULOPATHY, LUMBAR REGION: ICD-10-CM

## 2019-11-21 ENCOUNTER — RX RENEWAL (OUTPATIENT)
Age: 54
End: 2019-11-21

## 2019-11-21 ENCOUNTER — TRANSCRIPTION ENCOUNTER (OUTPATIENT)
Age: 54
End: 2019-11-21

## 2019-11-22 ENCOUNTER — APPOINTMENT (OUTPATIENT)
Dept: PHYSICAL MEDICINE AND REHAB | Facility: CLINIC | Age: 54
End: 2019-11-22
Payer: MEDICAID

## 2019-11-22 VITALS — BODY MASS INDEX: 28.41 KG/M2 | HEIGHT: 67 IN | OXYGEN SATURATION: 97 % | HEART RATE: 111 BPM | WEIGHT: 181 LBS

## 2019-11-22 DIAGNOSIS — M47.817 SPONDYLOSIS W/OUT MYELOPATHY OR RADICULOPATHY, LUMBOSACRAL REGION: ICD-10-CM

## 2019-11-22 PROCEDURE — 99213 OFFICE O/P EST LOW 20 MIN: CPT

## 2019-11-22 NOTE — HISTORY OF PRESENT ILLNESS
[FreeTextEntry1] : Patient presents for follow up after bilateral L3/4/5 MBB. Notes 90% improvement since the injection. Pain is much better now. \par \par \par Initial history:\par Location: low back pain\par Quality: aching pain\par Duration: 3-4 years\par Frequency: intermittent\par Alleviating Factors: laying flat\par Aggravating Factors: prolonged standing, walking\par Conservative treatment tried: PT and oral NSAIDs \par Associated Symptoms: denies\par Prior Studies: MRI of L spine, multi level degenerative changes seen along with facet arthropathy at L4/5, and L5/S1 \par

## 2019-11-22 NOTE — PHYSICAL EXAM
[Normal] : Oriented to person, place, and time, insight and judgement were intact and the affect was normal [de-identified] : No gross deformity, no TTP over the lumbar paraspinals, FROM, SLR negative\par

## 2019-11-22 NOTE — ASSESSMENT
[FreeTextEntry1] : Improved after bilateral L3/4/5 MBB, no indication to repeat at this time. If pain returns, will repeat MBB and consider RFA in the future.

## 2019-11-25 ENCOUNTER — TRANSCRIPTION ENCOUNTER (OUTPATIENT)
Age: 54
End: 2019-11-25

## 2019-11-29 ENCOUNTER — APPOINTMENT (OUTPATIENT)
Dept: INTERNAL MEDICINE | Facility: CLINIC | Age: 54
End: 2019-11-29

## 2019-12-02 ENCOUNTER — TRANSCRIPTION ENCOUNTER (OUTPATIENT)
Age: 54
End: 2019-12-02

## 2020-06-17 ENCOUNTER — RX RENEWAL (OUTPATIENT)
Age: 55
End: 2020-06-17

## 2020-09-04 ENCOUNTER — APPOINTMENT (OUTPATIENT)
Dept: INTERNAL MEDICINE | Facility: CLINIC | Age: 55
End: 2020-09-04
Payer: MEDICAID

## 2020-09-04 ENCOUNTER — NON-APPOINTMENT (OUTPATIENT)
Age: 55
End: 2020-09-04

## 2020-09-04 VITALS
OXYGEN SATURATION: 97 % | DIASTOLIC BLOOD PRESSURE: 81 MMHG | WEIGHT: 184 LBS | TEMPERATURE: 96.8 F | SYSTOLIC BLOOD PRESSURE: 139 MMHG | HEIGHT: 67 IN | HEART RATE: 67 BPM | BODY MASS INDEX: 28.88 KG/M2

## 2020-09-04 VITALS — DIASTOLIC BLOOD PRESSURE: 80 MMHG | SYSTOLIC BLOOD PRESSURE: 122 MMHG

## 2020-09-04 PROCEDURE — 36415 COLL VENOUS BLD VENIPUNCTURE: CPT

## 2020-09-04 PROCEDURE — 99396 PREV VISIT EST AGE 40-64: CPT | Mod: 25

## 2020-09-04 PROCEDURE — 93000 ELECTROCARDIOGRAM COMPLETE: CPT

## 2020-09-04 NOTE — COUNSELING
[No throw rugs] : No throw rugs [Fall prevention counseling provided] : Fall prevention counseling provided [Adequate lighting] : Adequate lighting [Use proper foot wear] : Use proper foot wear [Good understanding] : Patient has a good understanding of disease, goals and obesity follow-up plan

## 2020-09-04 NOTE — PHYSICAL EXAM
[No Acute Distress] : no acute distress [Well Developed] : well developed [Well Nourished] : well nourished [Well-Appearing] : well-appearing [EOMI] : extraocular movements intact [PERRL] : pupils equal round and reactive to light [Normal Sclera/Conjunctiva] : normal sclera/conjunctiva [Normal Oropharynx] : the oropharynx was normal [Normal Outer Ear/Nose] : the outer ears and nose were normal in appearance [No JVD] : no jugular venous distention [Supple] : supple [No Lymphadenopathy] : no lymphadenopathy [No Respiratory Distress] : no respiratory distress  [Thyroid Normal, No Nodules] : the thyroid was normal and there were no nodules present [No Accessory Muscle Use] : no accessory muscle use [Regular Rhythm] : with a regular rhythm [Clear to Auscultation] : lungs were clear to auscultation bilaterally [Normal Rate] : normal rate  [No Murmur] : no murmur heard [No Carotid Bruits] : no carotid bruits [Normal S1, S2] : normal S1 and S2 [No Abdominal Bruit] : a ~M bruit was not heard ~T in the abdomen [No Varicosities] : no varicosities [No Edema] : there was no peripheral edema [No Palpable Aorta] : no palpable aorta [Pedal Pulses Present] : the pedal pulses are present [Soft] : abdomen soft [No Extremity Clubbing/Cyanosis] : no extremity clubbing/cyanosis [No Masses] : no abdominal mass palpated [Non-distended] : non-distended [Non Tender] : non-tender [No HSM] : no HSM [Normal Posterior Cervical Nodes] : no posterior cervical lymphadenopathy [Normal Bowel Sounds] : normal bowel sounds [Normal Anterior Cervical Nodes] : no anterior cervical lymphadenopathy [No Spinal Tenderness] : no spinal tenderness [No Joint Swelling] : no joint swelling [No CVA Tenderness] : no CVA  tenderness [Grossly Normal Strength/Tone] : grossly normal strength/tone [Coordination Grossly Intact] : coordination grossly intact [No Rash] : no rash [Normal Affect] : the affect was normal [No Focal Deficits] : no focal deficits [Normal Gait] : normal gait [Normal Insight/Judgement] : insight and judgment were intact

## 2020-09-08 LAB
ALBUMIN SERPL ELPH-MCNC: 4.6 G/DL
ALP BLD-CCNC: 75 U/L
ALT SERPL-CCNC: 35 U/L
ANION GAP SERPL CALC-SCNC: 11 MMOL/L
AST SERPL-CCNC: 28 U/L
BASOPHILS # BLD AUTO: 0.03 K/UL
BASOPHILS NFR BLD AUTO: 0.6 %
BILIRUB SERPL-MCNC: 0.4 MG/DL
BUN SERPL-MCNC: 14 MG/DL
CALCIUM SERPL-MCNC: 9.3 MG/DL
CHLORIDE SERPL-SCNC: 103 MMOL/L
CHOLEST SERPL-MCNC: 146 MG/DL
CHOLEST/HDLC SERPL: 3.1 RATIO
CO2 SERPL-SCNC: 27 MMOL/L
CREAT SERPL-MCNC: 1.08 MG/DL
EOSINOPHIL # BLD AUTO: 0.12 K/UL
EOSINOPHIL NFR BLD AUTO: 2.5 %
ESTIMATED AVERAGE GLUCOSE: 117 MG/DL
GLUCOSE SERPL-MCNC: 87 MG/DL
HBA1C MFR BLD HPLC: 5.7 %
HCT VFR BLD CALC: 44 %
HDLC SERPL-MCNC: 46 MG/DL
HGB BLD-MCNC: 14.3 G/DL
IMM GRANULOCYTES NFR BLD AUTO: 0 %
LDLC SERPL CALC-MCNC: 74 MG/DL
LYMPHOCYTES # BLD AUTO: 1.69 K/UL
LYMPHOCYTES NFR BLD AUTO: 35.3 %
MAN DIFF?: NORMAL
MCHC RBC-ENTMCNC: 30.2 PG
MCHC RBC-ENTMCNC: 32.5 GM/DL
MCV RBC AUTO: 92.8 FL
MONOCYTES # BLD AUTO: 0.59 K/UL
MONOCYTES NFR BLD AUTO: 12.3 %
NEUTROPHILS # BLD AUTO: 2.36 K/UL
NEUTROPHILS NFR BLD AUTO: 49.3 %
PLATELET # BLD AUTO: 232 K/UL
POTASSIUM SERPL-SCNC: 3.9 MMOL/L
PROT SERPL-MCNC: 7.1 G/DL
RBC # BLD: 4.74 M/UL
RBC # FLD: 13.2 %
SODIUM SERPL-SCNC: 142 MMOL/L
TRIGL SERPL-MCNC: 121 MG/DL
WBC # FLD AUTO: 4.79 K/UL

## 2020-09-08 NOTE — HISTORY OF PRESENT ILLNESS
[FreeTextEntry1] : CPE [de-identified] : 55\par \par Working as a  [employer]\par HTN\par Lumbar disc herniation  No further pain post SHOTS  Shots at [facility]  22Nov2019\par \par Glaucoma saw ophthal\par \par SH  vegetables  dinner only  WALKING  NOT sexually active  not depressed\par Less pain \par

## 2020-09-08 NOTE — REVIEW OF SYSTEMS
[Fever] : no fever [Earache] : no earache [Discharge] : no discharge [Chest Pain] : no chest pain [Palpitations] : no palpitations [Orthopena] : no orthopnea

## 2020-11-11 ENCOUNTER — EMERGENCY (EMERGENCY)
Facility: HOSPITAL | Age: 55
LOS: 1 days | Discharge: HOME | End: 2020-11-11
Attending: EMERGENCY MEDICINE
Payer: MEDICAID

## 2020-11-11 VITALS
DIASTOLIC BLOOD PRESSURE: 93 MMHG | RESPIRATION RATE: 18 BRPM | OXYGEN SATURATION: 99 % | HEART RATE: 75 BPM | TEMPERATURE: 98 F | SYSTOLIC BLOOD PRESSURE: 144 MMHG

## 2020-11-11 VITALS
HEART RATE: 72 BPM | HEIGHT: 67 IN | OXYGEN SATURATION: 96 % | SYSTOLIC BLOOD PRESSURE: 162 MMHG | WEIGHT: 175.05 LBS | DIASTOLIC BLOOD PRESSURE: 109 MMHG | RESPIRATION RATE: 20 BRPM | TEMPERATURE: 98 F

## 2020-11-11 LAB
ALBUMIN SERPL ELPH-MCNC: 4.4 G/DL — SIGNIFICANT CHANGE UP (ref 3.3–5)
ALP SERPL-CCNC: 71 U/L — SIGNIFICANT CHANGE UP (ref 40–120)
ALT FLD-CCNC: 39 U/L — SIGNIFICANT CHANGE UP (ref 10–45)
ANION GAP SERPL CALC-SCNC: 11 MMOL/L — SIGNIFICANT CHANGE UP (ref 5–17)
ANION GAP SERPL CALC-SCNC: 12 MMOL/L — SIGNIFICANT CHANGE UP (ref 5–17)
APPEARANCE UR: CLEAR — SIGNIFICANT CHANGE UP
AST SERPL-CCNC: 32 U/L — SIGNIFICANT CHANGE UP (ref 10–40)
BACTERIA # UR AUTO: NEGATIVE — SIGNIFICANT CHANGE UP
BASOPHILS # BLD AUTO: 0.02 K/UL — SIGNIFICANT CHANGE UP (ref 0–0.2)
BASOPHILS NFR BLD AUTO: 0.2 % — SIGNIFICANT CHANGE UP (ref 0–2)
BILIRUB SERPL-MCNC: 0.4 MG/DL — SIGNIFICANT CHANGE UP (ref 0.2–1.2)
BILIRUB UR-MCNC: NEGATIVE — SIGNIFICANT CHANGE UP
BUN SERPL-MCNC: 18 MG/DL — SIGNIFICANT CHANGE UP (ref 7–23)
BUN SERPL-MCNC: 19 MG/DL — SIGNIFICANT CHANGE UP (ref 7–23)
CALCIUM SERPL-MCNC: 9.2 MG/DL — SIGNIFICANT CHANGE UP (ref 8.4–10.5)
CALCIUM SERPL-MCNC: 9.7 MG/DL — SIGNIFICANT CHANGE UP (ref 8.4–10.5)
CHLORIDE SERPL-SCNC: 104 MMOL/L — SIGNIFICANT CHANGE UP (ref 96–108)
CHLORIDE SERPL-SCNC: 104 MMOL/L — SIGNIFICANT CHANGE UP (ref 96–108)
CO2 SERPL-SCNC: 25 MMOL/L — SIGNIFICANT CHANGE UP (ref 22–31)
CO2 SERPL-SCNC: 26 MMOL/L — SIGNIFICANT CHANGE UP (ref 22–31)
COLOR SPEC: COLORLESS — SIGNIFICANT CHANGE UP
CREAT SERPL-MCNC: 1.44 MG/DL — HIGH (ref 0.5–1.3)
CREAT SERPL-MCNC: 1.5 MG/DL — HIGH (ref 0.5–1.3)
DIFF PNL FLD: NEGATIVE — SIGNIFICANT CHANGE UP
EOSINOPHIL # BLD AUTO: 0.01 K/UL — SIGNIFICANT CHANGE UP (ref 0–0.5)
EOSINOPHIL NFR BLD AUTO: 0.1 % — SIGNIFICANT CHANGE UP (ref 0–6)
EPI CELLS # UR: 0 /HPF — SIGNIFICANT CHANGE UP
GLUCOSE SERPL-MCNC: 100 MG/DL — HIGH (ref 70–99)
GLUCOSE SERPL-MCNC: 129 MG/DL — HIGH (ref 70–99)
GLUCOSE UR QL: NEGATIVE — SIGNIFICANT CHANGE UP
HCT VFR BLD CALC: 42.9 % — SIGNIFICANT CHANGE UP (ref 39–50)
HGB BLD-MCNC: 14.6 G/DL — SIGNIFICANT CHANGE UP (ref 13–17)
IMM GRANULOCYTES NFR BLD AUTO: 0.3 % — SIGNIFICANT CHANGE UP (ref 0–1.5)
KETONES UR-MCNC: NEGATIVE — SIGNIFICANT CHANGE UP
LEUKOCYTE ESTERASE UR-ACNC: NEGATIVE — SIGNIFICANT CHANGE UP
LYMPHOCYTES # BLD AUTO: 0.73 K/UL — LOW (ref 1–3.3)
LYMPHOCYTES # BLD AUTO: 8.1 % — LOW (ref 13–44)
MCHC RBC-ENTMCNC: 30.1 PG — SIGNIFICANT CHANGE UP (ref 27–34)
MCHC RBC-ENTMCNC: 34 GM/DL — SIGNIFICANT CHANGE UP (ref 32–36)
MCV RBC AUTO: 88.5 FL — SIGNIFICANT CHANGE UP (ref 80–100)
MONOCYTES # BLD AUTO: 0.59 K/UL — SIGNIFICANT CHANGE UP (ref 0–0.9)
MONOCYTES NFR BLD AUTO: 6.5 % — SIGNIFICANT CHANGE UP (ref 2–14)
NEUTROPHILS # BLD AUTO: 7.66 K/UL — HIGH (ref 1.8–7.4)
NEUTROPHILS NFR BLD AUTO: 84.8 % — HIGH (ref 43–77)
NITRITE UR-MCNC: NEGATIVE — SIGNIFICANT CHANGE UP
NRBC # BLD: 0 /100 WBCS — SIGNIFICANT CHANGE UP (ref 0–0)
PH UR: 6.5 — SIGNIFICANT CHANGE UP (ref 5–8)
PLATELET # BLD AUTO: 205 K/UL — SIGNIFICANT CHANGE UP (ref 150–400)
POTASSIUM SERPL-MCNC: 3.4 MMOL/L — LOW (ref 3.5–5.3)
POTASSIUM SERPL-MCNC: 3.6 MMOL/L — SIGNIFICANT CHANGE UP (ref 3.5–5.3)
POTASSIUM SERPL-SCNC: 3.4 MMOL/L — LOW (ref 3.5–5.3)
POTASSIUM SERPL-SCNC: 3.6 MMOL/L — SIGNIFICANT CHANGE UP (ref 3.5–5.3)
PROT SERPL-MCNC: 7.3 G/DL — SIGNIFICANT CHANGE UP (ref 6–8.3)
PROT UR-MCNC: NEGATIVE — SIGNIFICANT CHANGE UP
RBC # BLD: 4.85 M/UL — SIGNIFICANT CHANGE UP (ref 4.2–5.8)
RBC # FLD: 12.2 % — SIGNIFICANT CHANGE UP (ref 10.3–14.5)
RBC CASTS # UR COMP ASSIST: 0 /HPF — SIGNIFICANT CHANGE UP (ref 0–4)
SODIUM SERPL-SCNC: 141 MMOL/L — SIGNIFICANT CHANGE UP (ref 135–145)
SODIUM SERPL-SCNC: 141 MMOL/L — SIGNIFICANT CHANGE UP (ref 135–145)
SP GR SPEC: 1.02 — SIGNIFICANT CHANGE UP (ref 1.01–1.02)
UROBILINOGEN FLD QL: NEGATIVE — SIGNIFICANT CHANGE UP
WBC # BLD: 9.04 K/UL — SIGNIFICANT CHANGE UP (ref 3.8–10.5)
WBC # FLD AUTO: 9.04 K/UL — SIGNIFICANT CHANGE UP (ref 3.8–10.5)
WBC UR QL: 0 /HPF — SIGNIFICANT CHANGE UP (ref 0–5)

## 2020-11-11 PROCEDURE — 99284 EMERGENCY DEPT VISIT MOD MDM: CPT | Mod: 25

## 2020-11-11 PROCEDURE — 96374 THER/PROPH/DIAG INJ IV PUSH: CPT

## 2020-11-11 PROCEDURE — 74176 CT ABD & PELVIS W/O CONTRAST: CPT

## 2020-11-11 PROCEDURE — 80053 COMPREHEN METABOLIC PANEL: CPT

## 2020-11-11 PROCEDURE — 85025 COMPLETE CBC W/AUTO DIFF WBC: CPT

## 2020-11-11 PROCEDURE — 81001 URINALYSIS AUTO W/SCOPE: CPT

## 2020-11-11 PROCEDURE — 99285 EMERGENCY DEPT VISIT HI MDM: CPT

## 2020-11-11 PROCEDURE — 80048 BASIC METABOLIC PNL TOTAL CA: CPT

## 2020-11-11 PROCEDURE — 87086 URINE CULTURE/COLONY COUNT: CPT

## 2020-11-11 PROCEDURE — 74176 CT ABD & PELVIS W/O CONTRAST: CPT | Mod: 26

## 2020-11-11 RX ORDER — OXYCODONE HYDROCHLORIDE 5 MG/1
5 TABLET ORAL ONCE
Refills: 0 | Status: DISCONTINUED | OUTPATIENT
Start: 2020-11-11 | End: 2020-11-11

## 2020-11-11 RX ORDER — OXYCODONE HYDROCHLORIDE 5 MG/1
1 TABLET ORAL
Qty: 9 | Refills: 0
Start: 2020-11-11 | End: 2020-11-13

## 2020-11-11 RX ORDER — LIDOCAINE 4 G/100G
1 CREAM TOPICAL ONCE
Refills: 0 | Status: COMPLETED | OUTPATIENT
Start: 2020-11-11 | End: 2020-11-11

## 2020-11-11 RX ORDER — TAMSULOSIN HYDROCHLORIDE 0.4 MG/1
0.4 CAPSULE ORAL ONCE
Refills: 0 | Status: COMPLETED | OUTPATIENT
Start: 2020-11-11 | End: 2020-11-11

## 2020-11-11 RX ORDER — TAMSULOSIN HYDROCHLORIDE 0.4 MG/1
1 CAPSULE ORAL
Qty: 7 | Refills: 0
Start: 2020-11-11 | End: 2020-11-17

## 2020-11-11 RX ORDER — SODIUM CHLORIDE 9 MG/ML
1000 INJECTION INTRAMUSCULAR; INTRAVENOUS; SUBCUTANEOUS ONCE
Refills: 0 | Status: COMPLETED | OUTPATIENT
Start: 2020-11-11 | End: 2020-11-11

## 2020-11-11 RX ORDER — KETOROLAC TROMETHAMINE 30 MG/ML
15 SYRINGE (ML) INJECTION ONCE
Refills: 0 | Status: DISCONTINUED | OUTPATIENT
Start: 2020-11-11 | End: 2020-11-11

## 2020-11-11 RX ORDER — ACETAMINOPHEN 500 MG
975 TABLET ORAL ONCE
Refills: 0 | Status: COMPLETED | OUTPATIENT
Start: 2020-11-11 | End: 2020-11-11

## 2020-11-11 RX ADMIN — OXYCODONE HYDROCHLORIDE 5 MILLIGRAM(S): 5 TABLET ORAL at 18:18

## 2020-11-11 RX ADMIN — TAMSULOSIN HYDROCHLORIDE 0.4 MILLIGRAM(S): 0.4 CAPSULE ORAL at 18:17

## 2020-11-11 RX ADMIN — Medication 975 MILLIGRAM(S): at 16:23

## 2020-11-11 RX ADMIN — Medication 15 MILLIGRAM(S): at 15:52

## 2020-11-11 RX ADMIN — Medication 15 MILLIGRAM(S): at 17:00

## 2020-11-11 RX ADMIN — LIDOCAINE 1 PATCH: 4 CREAM TOPICAL at 16:23

## 2020-11-11 RX ADMIN — Medication 975 MILLIGRAM(S): at 17:00

## 2020-11-11 RX ADMIN — SODIUM CHLORIDE 1000 MILLILITER(S): 9 INJECTION INTRAMUSCULAR; INTRAVENOUS; SUBCUTANEOUS at 18:17

## 2020-11-11 NOTE — ED PROVIDER NOTE - NSFOLLOWUPCLINICS_GEN_ALL_ED_FT
[facility]  Urology  [address] [place] [address] - [address] [place] [address]  Phone: [phone]  Fax:   Follow Up Time: 1-3 Days

## 2020-11-11 NOTE — ED PROVIDER NOTE - PROGRESS NOTE DETAILS
Pt resting comfortably. Repeat labs stable, slightly improved Cr. Pt reports pain is controlled at this time. No fever/chills, no n/v. Offered observation but pt prefers to go home. Will send a few days of pain meds and give urology f/u. Return precautions advised. Patient understands. - Janay Cohen PA-C

## 2020-11-11 NOTE — ED ADULT NURSE NOTE - PMH
Glaucoma    Herniated intervertebral disc of lumbar spine    High cholesterol    HTN (hypertension)

## 2020-11-11 NOTE — ED PROVIDER NOTE - OBJECTIVE STATEMENT
54yo M with PMH HTN, herniated lower back disc, presenting with L lower back/ L flank pain x 1 day. Pain is intermittent, throbbing, worse with moving. Took OTC "back medicine" this morning with minimal relief. Able to ambulate. Works as  and denies any recent heavy lifitng. Denies any fever/chills, CP, SOB, abdominal pain, n/v, dysuria, hematuria, hx of kidney stones, numbness/tingling, saddle anesthesia, bowel/bladder dysfunction.

## 2020-11-11 NOTE — ED PROVIDER NOTE - ATTENDING CONTRIBUTION TO CARE
Attending Statement (ELIZABETH Marie MD):    HPI: 56y/o M with h/o HTN, low back disc disease, c/o left back /flank pain, for 1 day; intermittent, not improving with typical OTC medications; no recent injuries/falls.  No h/o ivda, no recent medical procedures.  No bladder/bowel incontinence.  Pain at times severe, but is waxing/waning.  Not a/w movement/position change.    Review of Systems:  -General: no fever or chills  -ENT: no congestion, no difficulty swallowing  -Pulmonary: no cough, no shortness of breath  -Cardiac: no chest pain, no palpitations  -Gastrointestinal: no abdominal pain, no nausea, no vomiting, and no diarrhea.  -Genitourinary: no blood or pain with urination  -Musculoskeletal: + back pain  -Skin: no rashes  -Endocrine: No h/o diabetes or thyroid disease  -Neurologic: No focal weakness or numbness    All else negative unless otherwise specified elsewhere in this note.    PSH/PMH as noted above    On Physical Exam:  General: well appearing, in NAD, speaking clearly in full sentences and without difficulty; cooperative with exam  HEENT: PERRL, MMM  Neck: no neck tenderness, no nuchal rigidity  Cardiac: normal s1, s2; RRR; no MGR  Lungs: CTABL  Abdomen: soft nontender/nondistended  Back: No midline spinal tenderness, no CVA tendenress, no ecchymoses/rashes/erythema  : no bladder tenderness or distension  Skin: intact, no rash  Extremities: no peripheral edema, no gross deformities  Neuro: no gross neurologic deficits    MDM: Atraumatic left back/flank pain; no CVA tenderness, no midline spinal tenderness, no abdominal tenderness; appears mildly uncomfortable but not in acute distress; MSK back pain vs obstructing ureteral stone; no evidence of infectious etiology based on clinical exam.  Will obtain labs: cbc (to evaluate for leukocytosis or anemia), CMP (to evaluate for electrolyte abnormalities or renal/liver dysfunction), ua (r/o uti) and obtain CT AP to further evaluate for stone.

## 2020-11-11 NOTE — ED PROVIDER NOTE - SHIFT CHANGE DETAILS
Attending note (Frank): I have endorsed this patient to the incoming physician, including clinical history, physical exam, current results and assessment/plan. -Pending CT.

## 2020-11-11 NOTE — ED PROVIDER NOTE - NSFOLLOWUPINSTRUCTIONS_ED_ALL_ED_FT
Rest. Stay well hydrated. Continue to strain your urine.   Take Flomax 0.4mg daily.   Take Ibuprofen (Motrin, Advil) 600mg every 8 hrs for pain as needed. Take with food.   May alternate with Tylenol 650mg every 6 hours for pain as needed.     Take oxycodone 5mg every 8 hours as needed for severe pain *** caution SIDE EFFECT of drowsiness - DO NOT drive or drink alcohol while taking this medication.     Follow up with Urologist upon discharge. Please also follow up with your primary care provider. Bring copy of printed results with you.     Return to ER for any worsening pain, fever/chills, difficulty urinating, vomiting, or any other concerning symptoms.     ------------------------------------------------------------------------------      Kidney Stones    WHAT YOU NEED TO KNOW:    Kidney stones form in the urinary system when the water and waste in your urine are out of balance. When this happens, certain types of waste crystals separate from the urine. The crystals build up and form kidney stones. You may have more than one kidney stone.     Kidney Stones          DISCHARGE INSTRUCTIONS:    Return to the emergency department if:   •You have vomiting that is not relieved by medicine.          Contact your healthcare provider if:   •You have a fever.       •You have trouble passing urine.      •You see blood in your urine.      •You have severe pain.      •You have any questions or concerns about your condition or care.      Medicines:   •NSAIDs, such as ibuprofen, help decrease swelling, pain, and fever. This medicine is available with or without a doctor's order. NSAIDs can cause stomach bleeding or kidney problems in certain people. If you take blood thinner medicine, always ask your healthcare provider if NSAIDs are safe for you. Always read the medicine label and follow directions.      •Prescription pain medicine may be given. Ask your healthcare provider how to take this medicine safely. Some prescription pain medicines contain acetaminophen. Do not take other medicines that contain acetaminophen without talking to your healthcare provider. Too much acetaminophen may cause liver damage. Prescription pain medicine may cause constipation. Ask your healthcare provider how to prevent or treat constipation.       •Medicines to balance your electrolytes may be needed.       •Take your medicine as directed. Contact your healthcare provider if you think your medicine is not helping or if you have side effects. Tell him or her if you are allergic to any medicine. Keep a list of the medicines, vitamins, and herbs you take. Include the amounts, and when and why you take them. Bring the list or the pill bottles to follow-up visits. Carry your medicine list with you in case of an emergency.      Follow up with your healthcare provider as directed: You may need to return for more tests. Write down your questions so you remember to ask them during your visits.    What you can do to manage kidney stones:   •Drink more liquids. Your healthcare provider may tell you to drink at least 8 to 12 (eight-ounce) cups of liquids each day. This helps flush out the kidney stones when you urinate. Water is the best liquid to drink.      •Strain your urine every time you go to the bathroom. Urinate through a strainer or a piece of thin cloth to catch the stones. Take the stones to your healthcare provider so they can be sent to the lab for tests. This will help your healthcare providers plan the best treatment for you.  Look for Stones in the Filter           •Eat a variety of healthy foods. Healthy foods include fruits, vegetables, whole-grain breads, low-fat dairy products, beans, and fish. You may need to limit how much sodium (salt) or protein you eat. Ask for information about the best foods for you.      •Stay active. Your stones may pass more easily if you stay active. Exercise can also help you manage your weight. Ask about the best activities for you.      After you pass the kidney stones: Your healthcare provider may order a 24-hour urine test. Results from a 24-hour urine test will help your healthcare provider plan ways to prevent more stones from forming. Your healthcare provider will give you more instructions.

## 2020-11-11 NOTE — ED PROVIDER NOTE - PATIENT PORTAL LINK FT
You can access the FollowMyHealth Patient Portal offered by [facility] by registering at the following website: [email or web address]. By joining [employer]’s FollowMyHealth portal, you will also be able to view your health information using other applications (apps) compatible with our system.

## 2020-11-11 NOTE — ED ADULT NURSE REASSESSMENT NOTE - NS ED NURSE REASSESS COMMENT FT1
Pt reports no relief from the Toradol, provided with Tylenol and a lidocaine patch for further relief.

## 2020-11-11 NOTE — ED ADULT NURSE NOTE - OBJECTIVE STATEMENT
56 y/o Male presenting to the ED ambulatory, A&Ox3, complaining of left lower back pain that started this morning, pt denies doing any activity when it started. Hx of herniated disks. Pain worsens with sitting or laying down, pt able to ambulate. Pt took over the counter medication prior to arrival, does not recall the name with very little relief. Denies CP, SOB, incontinence, numbness, tingling, headache, dizziness, blurry vision, N/V/D, dysuria, hematuria, constipation. Back not tender to touch. Abdomen soft, nontender, nondistended. Pt appears well, in no current distress. Safety and comfort measures provided, bed locked and in lowest position, side rails up for safety. Call bell within reach. Awaiting MD evaluation.

## 2020-11-11 NOTE — ED PROVIDER NOTE - DATE/TIME 1
2017       TO:  Dr. Zach Ramsey MD   [id number] [address] / [place] [address]   Fax# [phone]        RE:  Mushtaq Velasquez   : 1985               Home Phone: [phone] (home)    Your patient, Mushtaq Velasquez, is scheduled for the following surgical procedure with Dr. Nagi Schneider on 2017 at [facility].      Planned Procedure: left ankle arthroscopy for loose body removal; open reduction and internal fixation of trimalleolar fracture left ankle including fixation of posterior lip; open treatment of syndesmosis disruption left ankle    This procedure will be performed under General and Regional Block anesthesia.    Please perform and send results for the following pre-operative test/requirements:    · - History & Physical with statement of the Medical Clearance for planned   procedure.  · - PRE-OP History & Physical   · - Chest X-ray  · - EKG    Please fax the results at least 5 days before surgery to:    · PreAdmission Testing at: [phone]    · Surgery Scheduling, Attn: Tarah at Fax # [phone].      If you have any questions or concerns, please feel free to contact me during routine business hours.    Sincerely,         Tarah at [phone]  Surgery Scheduler for Dr. Nagi Schneider  [facility]    
11-Nov-2020 20:26

## 2020-11-11 NOTE — ED PROVIDER NOTE - MUSCULOSKELETAL, MLM
Spine appears normal (no midline or paraspinal ttp), range of motion is not limited, no muscle or joint tenderness. No CVA tenderness.

## 2020-11-12 LAB
CULTURE RESULTS: SIGNIFICANT CHANGE UP
SPECIMEN SOURCE: SIGNIFICANT CHANGE UP

## 2020-11-16 ENCOUNTER — TRANSCRIPTION ENCOUNTER (OUTPATIENT)
Age: 55
End: 2020-11-16

## 2020-11-20 ENCOUNTER — APPOINTMENT (OUTPATIENT)
Dept: UROLOGY | Facility: CLINIC | Age: 55
End: 2020-11-20
Payer: MEDICAID

## 2020-11-20 VITALS
HEIGHT: 67 IN | DIASTOLIC BLOOD PRESSURE: 97 MMHG | SYSTOLIC BLOOD PRESSURE: 169 MMHG | WEIGHT: 169 LBS | HEART RATE: 75 BPM | RESPIRATION RATE: 15 BRPM | TEMPERATURE: 98 F | OXYGEN SATURATION: 98 % | BODY MASS INDEX: 26.53 KG/M2

## 2020-11-20 PROCEDURE — 99204 OFFICE O/P NEW MOD 45 MIN: CPT

## 2020-11-20 NOTE — REVIEW OF SYSTEMS
[Eyesight Problems] : eyesight problems [Abdominal Pain] : abdominal pain [Blood in urine that you can see] : blood visible in urine [History of kidney stones] : history of kidney stones [Negative] : Heme/Lymph

## 2020-11-27 NOTE — HISTORY OF PRESENT ILLNESS
[FreeTextEntry1] : 54 yo M presents with history of  severe left flank pain and vomiting x 1 day\par Some gross hematuria\par went to ER - left nephrolithiasis\par Passed some gritty sediment this week\par Pain resolved after\par Knew about kidney stones since 2008\par Drinks 1 bottle of water, 2 bottles of seltzer, no caffeine

## 2020-11-27 NOTE — ASSESSMENT
[FreeTextEntry1] : 56 yo M with nephrolithiasis, elevated BUN/Cr\par \par - Reviewed imaging and labwork from recent ER visit\par - Given symptoms, size and location, highly likely pt has passed his stone. Would continue observation at this time\par - BUN/Cr\par - renal US in 2 weeks\par - Discussed possible etiologies for nephrolithiasis. Reviewed behavioral modifications including adequate hydration, cutting back on coffee, dark sodas, dark teas, low sodium diet, increasing citrate levels with lemon juice. \par - Discussed importance of seeking medical attention should intractable flank pain with nausea, vomiting or fever occur\par

## 2020-11-30 LAB
BUN SERPL-MCNC: 17 MG/DL
CREAT SERPL-MCNC: 1.21 MG/DL

## 2020-12-04 ENCOUNTER — APPOINTMENT (OUTPATIENT)
Age: 55
End: 2020-12-04
Payer: MEDICAID

## 2020-12-04 VITALS — TEMPERATURE: 98 F

## 2020-12-04 PROCEDURE — 76705 ECHO EXAM OF ABDOMEN: CPT

## 2020-12-04 PROCEDURE — 99072 ADDL SUPL MATRL&STAF TM PHE: CPT

## 2020-12-07 ENCOUNTER — RX RENEWAL (OUTPATIENT)
Age: 55
End: 2020-12-07

## 2020-12-23 ENCOUNTER — RX RENEWAL (OUTPATIENT)
Age: 55
End: 2020-12-23

## 2020-12-28 ENCOUNTER — RX RENEWAL (OUTPATIENT)
Age: 55
End: 2020-12-28

## 2021-01-05 ENCOUNTER — APPOINTMENT (OUTPATIENT)
Dept: INTERNAL MEDICINE | Facility: CLINIC | Age: 56
End: 2021-01-05
Payer: MEDICAID

## 2021-01-05 VITALS
HEIGHT: 67 IN | SYSTOLIC BLOOD PRESSURE: 128 MMHG | TEMPERATURE: 97 F | HEART RATE: 83 BPM | BODY MASS INDEX: 29.19 KG/M2 | DIASTOLIC BLOOD PRESSURE: 87 MMHG | OXYGEN SATURATION: 97 % | WEIGHT: 186 LBS

## 2021-01-05 VITALS — DIASTOLIC BLOOD PRESSURE: 90 MMHG | SYSTOLIC BLOOD PRESSURE: 128 MMHG

## 2021-01-05 PROCEDURE — 99214 OFFICE O/P EST MOD 30 MIN: CPT

## 2021-01-05 PROCEDURE — 99072 ADDL SUPL MATRL&STAF TM PHE: CPT

## 2021-01-05 NOTE — HISTORY OF PRESENT ILLNESS
[FreeTextEntry1] : HTN [de-identified] : 56 yo B male HTN, lumbar disc herniation, glaucoma\par \par DBP 87 repeat\par took all BP \par \par Stop KClgiven LISINOPRIL

## 2021-01-05 NOTE — REVIEW OF SYSTEMS
[Fever] : no fever [Chest Pain] : no chest pain [Orthopena] : no orthopnea [Shortness Of Breath] : no shortness of breath [Wheezing] : no wheezing [Cough] : no cough [Headache] : no headache [Dizziness] : no dizziness [Insomnia] : no insomnia [Easy Bleeding] : no easy bleeding [Easy Bruising] : no easy bruising

## 2021-02-19 ENCOUNTER — APPOINTMENT (OUTPATIENT)
Dept: INTERNAL MEDICINE | Facility: CLINIC | Age: 56
End: 2021-02-19
Payer: MEDICAID

## 2021-02-19 VITALS — DIASTOLIC BLOOD PRESSURE: 80 MMHG | SYSTOLIC BLOOD PRESSURE: 130 MMHG

## 2021-02-19 VITALS
SYSTOLIC BLOOD PRESSURE: 133 MMHG | HEIGHT: 67 IN | DIASTOLIC BLOOD PRESSURE: 87 MMHG | BODY MASS INDEX: 28.41 KG/M2 | OXYGEN SATURATION: 97 % | TEMPERATURE: 97.4 F | WEIGHT: 181 LBS | HEART RATE: 78 BPM

## 2021-02-19 DIAGNOSIS — M54.12 RADICULOPATHY, CERVICAL REGION: ICD-10-CM

## 2021-02-19 PROCEDURE — 99214 OFFICE O/P EST MOD 30 MIN: CPT

## 2021-02-19 PROCEDURE — 99072 ADDL SUPL MATRL&STAF TM PHE: CPT

## 2021-02-19 RX ORDER — MUPIROCIN 20 MG/G
2 OINTMENT TOPICAL
Qty: 1 | Refills: 11 | Status: COMPLETED | COMMUNITY
Start: 2018-04-27 | End: 2021-02-19

## 2021-02-19 RX ORDER — ZOSTER VACCINE RECOMBINANT, ADJUVANTED 50 MCG/0.5
50 KIT INTRAMUSCULAR
Qty: 1 | Refills: 0 | Status: COMPLETED | OUTPATIENT
Start: 2019-03-25 | End: 2021-02-19

## 2021-02-19 NOTE — REVIEW OF SYSTEMS
[Fever] : no fever [Chest Pain] : no chest pain [Orthopena] : no orthopnea [Shortness Of Breath] : no shortness of breath

## 2021-02-19 NOTE — HISTORY OF PRESENT ILLNESS
[FreeTextEntry1] : Htn Management [de-identified] : 54 yo HTN  dyslipidemia  IFG  Cervical radiculopathy\par \par HTN added 5mg lisinopril to get DBP lower

## 2021-03-16 ENCOUNTER — APPOINTMENT (OUTPATIENT)
Dept: PHYSICAL MEDICINE AND REHAB | Facility: CLINIC | Age: 56
End: 2021-03-16
Payer: MEDICAID

## 2021-03-16 VITALS
OXYGEN SATURATION: 98 % | HEART RATE: 69 BPM | DIASTOLIC BLOOD PRESSURE: 94 MMHG | TEMPERATURE: 97.3 F | SYSTOLIC BLOOD PRESSURE: 143 MMHG

## 2021-03-16 PROCEDURE — 99214 OFFICE O/P EST MOD 30 MIN: CPT

## 2021-03-16 PROCEDURE — 99072 ADDL SUPL MATRL&STAF TM PHE: CPT

## 2021-03-16 NOTE — ASSESSMENT
[FreeTextEntry1] : - MRI of LS spine\par - Gabapentin 300 mg at bedtime- discussed risks and benefits of medication\par - Refer to Interventional spine. \par - Consider PT - after MRI\par - Discussed with patient red flags such as bladder/bowel incontinence, weakness, significant increase in pain level in which case patient should immediately present to Emergency Room.\par

## 2021-03-16 NOTE — HISTORY OF PRESENT ILLNESS
[FreeTextEntry1] : cc: L back pain down the leg. \par HPI:\par Was doing well until last week- developed L low back down the LLE. \par Non-traumatic onset.\par Throbbing pain.  Pain is 6/10 in severity- worse at night.\par Took some oxycontin this morning - did not help. \par No B/B incontnence.

## 2021-03-16 NOTE — PHYSICAL EXAM
[Normal] : Oriented to person, place, and time, insight and judgement were intact and the affect was normal [de-identified] : no resp distress [de-identified] : nml DPP [de-identified] : soft [de-identified] : Pain extension of lumbar spine. Full ROM lumbar spine w discomfort w end extension.  [de-identified] : antalgic gait [de-identified] : 1+ L patella, R patella and bl achilles 2+, + SLR on the left, motor 5/5 bl LEs

## 2021-03-23 ENCOUNTER — NON-APPOINTMENT (OUTPATIENT)
Age: 56
End: 2021-03-23

## 2021-03-26 ENCOUNTER — EMERGENCY (EMERGENCY)
Facility: HOSPITAL | Age: 56
LOS: 1 days | Discharge: HOME | End: 2021-03-26
Attending: PERSONAL EMERGENCY RESPONSE ATTENDANT
Payer: MEDICAID

## 2021-03-26 VITALS — SYSTOLIC BLOOD PRESSURE: 135 MMHG | HEART RATE: 76 BPM | DIASTOLIC BLOOD PRESSURE: 77 MMHG

## 2021-03-26 VITALS
SYSTOLIC BLOOD PRESSURE: 133 MMHG | RESPIRATION RATE: 18 BRPM | HEIGHT: 67 IN | TEMPERATURE: 98 F | WEIGHT: 179.9 LBS | HEART RATE: 72 BPM | DIASTOLIC BLOOD PRESSURE: 89 MMHG | OXYGEN SATURATION: 97 %

## 2021-03-26 PROCEDURE — 96372 THER/PROPH/DIAG INJ SC/IM: CPT

## 2021-03-26 PROCEDURE — 99284 EMERGENCY DEPT VISIT MOD MDM: CPT

## 2021-03-26 PROCEDURE — 99283 EMERGENCY DEPT VISIT LOW MDM: CPT | Mod: 25

## 2021-03-26 PROCEDURE — 93010 ELECTROCARDIOGRAM REPORT: CPT

## 2021-03-26 PROCEDURE — 93005 ELECTROCARDIOGRAM TRACING: CPT

## 2021-03-26 RX ORDER — KETOROLAC TROMETHAMINE 30 MG/ML
30 SYRINGE (ML) INJECTION ONCE
Refills: 0 | Status: DISCONTINUED | OUTPATIENT
Start: 2021-03-26 | End: 2021-03-26

## 2021-03-26 RX ORDER — LIDOCAINE 4 G/100G
1 CREAM TOPICAL ONCE
Refills: 0 | Status: COMPLETED | OUTPATIENT
Start: 2021-03-26 | End: 2021-03-26

## 2021-03-26 RX ORDER — ACETAMINOPHEN 500 MG
650 TABLET ORAL ONCE
Refills: 0 | Status: COMPLETED | OUTPATIENT
Start: 2021-03-26 | End: 2021-03-26

## 2021-03-26 RX ADMIN — Medication 650 MILLIGRAM(S): at 18:44

## 2021-03-26 RX ADMIN — LIDOCAINE 1 PATCH: 4 CREAM TOPICAL at 18:44

## 2021-03-26 RX ADMIN — Medication 30 MILLIGRAM(S): at 18:44

## 2021-03-26 RX ADMIN — Medication 30 MILLIGRAM(S): at 18:45

## 2021-03-26 RX ADMIN — Medication 650 MILLIGRAM(S): at 18:45

## 2021-03-26 NOTE — ED PROVIDER NOTE - PATIENT PORTAL LINK FT
You can access the FollowMyHealth Patient Portal offered by [facility] by registering at the following website: [email or web address]. By joining [employer]’s FollowMyHealth portal, you will also be able to view your health information using other applications (apps) compatible with our system.

## 2021-03-26 NOTE — ED PROVIDER NOTE - NSFOLLOWUPCLINICS_GEN_ALL_ED_FT
Sports Medicine  Sports Medicine  [address] [place] [address]  Phone: [phone]  Fax:   Follow Up Time: 1-3 Days

## 2021-03-26 NOTE — ED ADULT NURSE NOTE - OBJECTIVE STATEMENT
55 yr old man came in with lower left leg pain radiating from left buttock. no trauma otherwise healthy. on assessment a and o x 3 lungs clear abd soft non tender no swelling in extremities no n/v/d no other issues, able to walk with a lot of pain.

## 2021-03-26 NOTE — ED PROVIDER NOTE - PROGRESS NOTE DETAILS
Attending MD Mello.  Following pain control pt endorsing sig improvement in pain complaints.  Verbalizes understanding of need for follow-up and reasons to return emergently should he develop LE weakness, saddle anesthesia, bowel or bladder incontinence.

## 2021-03-26 NOTE — ED PROVIDER NOTE - NSFOLLOWUPINSTRUCTIONS_ED_ALL_ED_FT
Acute low back pain is sudden discomfort that lasts up to 6 weeks and makes activity difficult.    DISCHARGE INSTRUCTIONS:    Return to the emergency department if:   •You have severe pain.  •You have sudden stiffness and heaviness on both buttocks down to both legs.  •You have numbness or weakness in one leg, or pain in both legs.  •You have numbness in your genital area or across your lower back.  •You cannot control your urine or bowel movements.      Call your doctor if:   •You have a fever.  •You have pain at night or when you rest.  •Your pain does not get better with treatment.  •You have pain that worsens when you cough or sneeze.  •You suddenly feel something pop or snap in your back.  •You have questions or concerns about your condition or care. Acute low back pain is sudden discomfort that lasts up to 6 weeks and makes activity difficult.    DISCHARGE INSTRUCTIONS:    Return to the emergency department if:   •You have severe pain.  •You have sudden stiffness and heaviness on both buttocks down to both legs.  •You have numbness or weakness in one leg, or pain in both legs.  •You have numbness in your genital area or across your lower back.  •You cannot control your urine or bowel movements.      Call your doctor if:   •You have a fever.  •You have pain at night or when you rest.  •Your pain does not get better with treatment.  •You have pain that worsens when you cough or sneeze.  •You suddenly feel something pop or snap in your back.  •You have questions or concerns about your condition or care.    -You were seen in the Emergency Department (ED) for back pain. Lab and imaging results, if performed, were discussed with you along with your discharge diagnosis.    MEDICATIONS:  -Continue all other prescribed medicine, IF ANY, as per your primary care doctor's (PMD) recommendations.  -Please take over the counter Tylenol (also known as acetaminophen) 650mg every 6 hours or Ibuprofen (also known as motrin, advil) 600mg every 8 hour for your pain, IF ANY, unless you are not supposed to for any reason.  -Rest, stay hydrated with plenty of fluids (drink at least 2 Liters or 64 Ounces of water each day UNLESS you are supposed to restrict fluids or ANY reason.    FOLLOW-UP:  -Please follow up with your sports medicine doctor if symptoms return or for any concerning matter pertaining to your low back pain.

## 2021-03-26 NOTE — ED PROVIDER NOTE - ATTENDING CONTRIBUTION TO CARE
Attending MD Mello.  Agree with above.  Pt is a 54 yo male with pmhx of HTN, HLD, glaucoma and cronic back pain for several years.  Pt last had spinal injections 2 yrs ago.  No recent spinal injections.  Denies fevers/chills.  Pt presents to ED today because of increased lower back pain and development of pain down LLE over the last week.  NO recent falls/injuries reported. No associated loss of bowel/bladder control, LE edema, LE weakness, saddle anesthesia.  On exam pt has reproduction of pain with 30degree L straight leg raise.  No abdominal TTP.  Pt has worse pain at night.  Saw his pain management doc who started him on gabapentin ~1 wk ago and began 300 qday and inc to 300 BID.  Presents because of burning pain down LLE worse at night.  Pt has not had pain similar to this with previous back pain exacerbations.  Pt has bilateral equal bounding DP pulses.  No neuro deficits.  Planned admin of toradol, steroid taper and rx for naproxen/steroid taper.  PT has an appt for MRI next week.  Will also educate re: back pain return precautions.     Prolonged discussion with pt re: need to return to ED for any fevers/chills, loss of bowel/bladder control, development of numbness of perineum and genitals, development of LE weakness.  None of these features are currently present.  Pt referred to outpt spinal service for ongoing management.  He verbalizes understanding of above return precautions as well as recommendation that he follow-up for ongoing management.  Pt stable for discharge home.

## 2021-04-01 ENCOUNTER — APPOINTMENT (OUTPATIENT)
Dept: MRI IMAGING | Facility: CLINIC | Age: 56
End: 2021-04-01
Payer: MEDICAID

## 2021-04-01 ENCOUNTER — OUTPATIENT (OUTPATIENT)
Dept: OUTPATIENT SERVICES | Facility: HOSPITAL | Age: 56
LOS: 1 days | End: 2021-04-01
Payer: MEDICAID

## 2021-04-01 DIAGNOSIS — M54.16 RADICULOPATHY, LUMBAR REGION: ICD-10-CM

## 2021-04-01 DIAGNOSIS — M54.5 LOW BACK PAIN: ICD-10-CM

## 2021-04-01 DIAGNOSIS — Z00.8 ENCOUNTER FOR OTHER GENERAL EXAMINATION: ICD-10-CM

## 2021-04-01 PROCEDURE — 72148 MRI LUMBAR SPINE W/O DYE: CPT

## 2021-04-01 PROCEDURE — 72148 MRI LUMBAR SPINE W/O DYE: CPT | Mod: 26

## 2021-06-04 ENCOUNTER — APPOINTMENT (OUTPATIENT)
Age: 56
End: 2021-06-04
Payer: MEDICAID

## 2021-06-04 VITALS — TEMPERATURE: 96.8 F

## 2021-06-04 PROCEDURE — 99213 OFFICE O/P EST LOW 20 MIN: CPT

## 2021-06-06 NOTE — HISTORY OF PRESENT ILLNESS
[FreeTextEntry1] : 54 yo M presents with history of  severe left flank pain and vomiting x 1 day\par Some gross hematuria\par went to ER - left nephrolithiasis\par Passed some gritty sediment this week\par Pain resolved after\par Knew about kidney stones since 2008\par Drinks 1 bottle of water, 2 bottles of seltzer, no caffeine\par \par 6/4/21 Interval history: no issues since last visit\par No recurrence of vomiting or hematuria\par US done at last visit showed resolution of hydronephrosis

## 2021-06-06 NOTE — ASSESSMENT
[FreeTextEntry1] : 54 yo M with recurrent nephrolithiasis\par \par - Reaffirmed behavioral modifications\par - 24 hr urine collection ordered\par - Renal US in 6 months

## 2021-07-07 LAB
STONE PLEASE NOTE:: NORMAL
STONE REVIEW: NORMAL
STONE, AMMONIUM 24 HR URINE: 47
STONE, AMMONIUM URINE: NORMAL UG/DL
STONE, BRUSHITE: 1.16 RATIO
STONE, CALCIUM 24 HR URINE: 144.4 MG/24 HR
STONE, CALCIUM OXALATE: 5.28 RATIO
STONE, CALCIUM URINE: 10.5 MG/DL
STONE, CHLORIDE 24 HR URINE: 158
STONE, CHLORIDE: 115 MMOL/L
STONE, CITRATE 24 HR URINE: 241 MG/24 HR
STONE, CITRATE URINE: 175 MG/L
STONE, CREATININE 24 HR URINE: 1978.6 MG/24 HR
STONE, CREATININE URINE: 143.9 MG/DL
STONE, CYSTINE 24 HR URINE: 29.58 MG/24 HR
STONE, CYSTINE URINE: 21.51 MG/L
STONE, MAGNESIUM 24 HR URINE: 99 MG/24 HR
STONE, MAGNESIUM URINE: 7.2 MG/DL
STONE, OSMOLALITY: 737
STONE, OXALATE 24 HR URINE: 29 MG/24 HR
STONE, OXALATE URINE: 21 MG/L
STONE, PH URINE: 5.8
STONE, PHOSPHOROUS URINE: 76.2 MG/DL
STONE, PHOSPHORUS 24 HR URINE: 1047.8 MG/24 HR
STONE, POTASSIUM 24 HR URINE: 32.2
STONE, POTASSIUM URINE: 23.4 MMOL/L
STONE, SODIUM 24 HR URINE: 186
STONE, SODIUM URATE: 2.38 RATIO
STONE, SODIUM URINE: 135 MMOL/L
STONE, STRUVITE: 0.02 RATIO
STONE, SULFATE 24 HR URINE: 41
STONE, SULFATE URINE: 30 MEQ/L
STONE, URIC ACID 24 HR URINE: 264 MG/24 HR
STONE, URIC ACID URINE: 1.12 RATIO
STONE, URIC ACID URINE: 19.2 MG/DL
TOT VOL (PRESERVATIVE): 1375 ML/24 HR
TOTAL VOLUME URINE: 1375 ML/24 HR

## 2021-07-14 NOTE — ED PROVIDER NOTE - CROS ED CONS ALL NEG
Pt reports he went to the ER Sunday night  - not feeling well, SOB. BP 90/50. [place] team felt he needed to be diuresed. Breathing better. ECHO EF 65%. Recommending follow up with [facility].    Discussed with Dr Goyal, Requested CMP and UA,  and CT chest and abdomen without contrast this week. Follow up appt with [facility]     Pt updated. Orders faxed.    negative...

## 2021-09-07 ENCOUNTER — NON-APPOINTMENT (OUTPATIENT)
Age: 56
End: 2021-09-07

## 2021-09-07 ENCOUNTER — APPOINTMENT (OUTPATIENT)
Dept: INTERNAL MEDICINE | Facility: CLINIC | Age: 56
End: 2021-09-07
Payer: MEDICAID

## 2021-09-07 VITALS
HEIGHT: 66.25 IN | BODY MASS INDEX: 28.28 KG/M2 | SYSTOLIC BLOOD PRESSURE: 120 MMHG | DIASTOLIC BLOOD PRESSURE: 72 MMHG | WEIGHT: 176 LBS

## 2021-09-07 VITALS — DIASTOLIC BLOOD PRESSURE: 80 MMHG | SYSTOLIC BLOOD PRESSURE: 118 MMHG

## 2021-09-07 PROCEDURE — 93000 ELECTROCARDIOGRAM COMPLETE: CPT

## 2021-09-07 PROCEDURE — 99396 PREV VISIT EST AGE 40-64: CPT | Mod: 25

## 2021-09-07 RX ORDER — LISINOPRIL 5 MG/1
5 TABLET ORAL
Qty: 90 | Refills: 3 | Status: COMPLETED | COMMUNITY
Start: 2021-01-05 | End: 2021-02-14

## 2021-09-07 NOTE — ASSESSMENT
[FreeTextEntry1] : Both 2013 ans 2021 W/U hypocitruciuria  increase citric  lemon lime orange in water and foods every day\par Fluid 3 liters especially during hot seasons  Decrease vit D\par \par Labs another time for PSA timingwhen has not ejaculated last 2 days  \par \par HTN  controlled without PM lisinopril dose. Unclear whether took it for 1 month Jan to feb and at some point stopped.  HCTZ amlodipine BID      HCTZ will reduce the calcium delivery to the kidney\par \par Flu vaccine October 2021\par \par PM&R follow up L5 radiculopathy   NO weakness today

## 2021-09-07 NOTE — HISTORY OF PRESENT ILLNESS
[FreeTextEntry1] : Worsening L5 impingemment LS spine  L sided paresthesia sicatica  HOSP [de-identified] : 55 yo B male  HTN  BACK PAIN\par \par Spondylosis lumbar L4L5  moderate large disc extrusion  impingement of L5 LEFT NERVE\par No bowel nor bladder incontinence, no saddle anesthesia, no foot drop nor weakness, intermitent\par L4 distribution parethesia and THROBBING and grinding pulling disturbing his sleep. ANDREW helped in the past  has RX\par Taking vit D 1000   needs citrate\par \par Chronic LBP worse 2017 PT and PM&R  L5 nerve compression MRI Apr 2021\par \par FH no kidney stone\par \par hypocitrate calcium kidney stone W/U 2013 and 2021 confirms  No K citrate as on lisinopril\par \par HA felopdipine 5mg\par \par amlodipine 2.5 BID  lisin  10mg  NOT TAKING 5mg PM  HCTZ 23

## 2021-09-07 NOTE — HEALTH RISK ASSESSMENT
[Fair] : ~his/her~ current health as fair  [Good] : ~his/her~  mood as  good [AdvancecareDate] : 09/21 [FreeTextEntry4] : discussion  jad Melgar  did not have conversation yet

## 2021-09-07 NOTE — REVIEW OF SYSTEMS
[Chest Pain] : no chest pain [Palpitations] : no palpitations [Orthopena] : no orthopnea [Shortness Of Breath] : no shortness of breath [Wheezing] : no wheezing [Cough] : no cough

## 2021-09-07 NOTE — PHYSICAL EXAM
[No Acute Distress] : no acute distress [Well Nourished] : well nourished [Well Developed] : well developed [Well-Appearing] : well-appearing [Normal Sclera/Conjunctiva] : normal sclera/conjunctiva [PERRL] : pupils equal round and reactive to light [EOMI] : extraocular movements intact [Normal Outer Ear/Nose] : the outer ears and nose were normal in appearance [Normal Oropharynx] : the oropharynx was normal [No JVD] : no jugular venous distention [No Lymphadenopathy] : no lymphadenopathy [Supple] : supple [Thyroid Normal, No Nodules] : the thyroid was normal and there were no nodules present [No Respiratory Distress] : no respiratory distress  [No Accessory Muscle Use] : no accessory muscle use [Clear to Auscultation] : lungs were clear to auscultation bilaterally [Normal Rate] : normal rate  [Regular Rhythm] : with a regular rhythm [Normal S1, S2] : normal S1 and S2 [No Murmur] : no murmur heard [No Carotid Bruits] : no carotid bruits [No Abdominal Bruit] : a ~M bruit was not heard ~T in the abdomen [No Varicosities] : no varicosities [Pedal Pulses Present] : the pedal pulses are present [No Edema] : there was no peripheral edema [No Palpable Aorta] : no palpable aorta [No Extremity Clubbing/Cyanosis] : no extremity clubbing/cyanosis [Soft] : abdomen soft [Non Tender] : non-tender [Non-distended] : non-distended [No Masses] : no abdominal mass palpated [No HSM] : no HSM [Normal Bowel Sounds] : normal bowel sounds [Normal Posterior Cervical Nodes] : no posterior cervical lymphadenopathy [Normal Anterior Cervical Nodes] : no anterior cervical lymphadenopathy [No CVA Tenderness] : no CVA  tenderness [No Spinal Tenderness] : no spinal tenderness [No Joint Swelling] : no joint swelling [Grossly Normal Strength/Tone] : grossly normal strength/tone [No Rash] : no rash [Coordination Grossly Intact] : coordination grossly intact [No Focal Deficits] : no focal deficits [Normal Gait] : normal gait [Deep Tendon Reflexes (DTR)] : deep tendon reflexes were 2+ and symmetric [Normal Affect] : the affect was normal [Normal Insight/Judgement] : insight and judgment were intact [de-identified] : not relaxed enough to assess DTR  L4 L5 S1  5/5  toe walk normal  heel walk S1 normal  no paresthesia L5

## 2021-09-17 LAB
ALBUMIN SERPL ELPH-MCNC: 4.6 G/DL
ALP BLD-CCNC: 92 U/L
ALT SERPL-CCNC: 41 U/L
ANION GAP SERPL CALC-SCNC: 12 MMOL/L
AST SERPL-CCNC: 30 U/L
BASOPHILS # BLD AUTO: 0.02 K/UL
BASOPHILS NFR BLD AUTO: 0.4 %
BILIRUB SERPL-MCNC: 0.6 MG/DL
BUN SERPL-MCNC: 15 MG/DL
CALCIUM SERPL-MCNC: 10.1 MG/DL
CHLORIDE SERPL-SCNC: 102 MMOL/L
CHOLEST SERPL-MCNC: 154 MG/DL
CO2 SERPL-SCNC: 28 MMOL/L
CREAT SERPL-MCNC: 1.12 MG/DL
CREAT SPEC-SCNC: 155 MG/DL
CREAT/PROT UR: 0.2 RATIO
EOSINOPHIL # BLD AUTO: 0.11 K/UL
EOSINOPHIL NFR BLD AUTO: 2.2 %
ESTIMATED AVERAGE GLUCOSE: 126 MG/DL
GLUCOSE SERPL-MCNC: 98 MG/DL
HBA1C MFR BLD HPLC: 6 %
HCT VFR BLD CALC: 43.6 %
HDLC SERPL-MCNC: 36 MG/DL
HGB BLD-MCNC: 14.7 G/DL
IMM GRANULOCYTES NFR BLD AUTO: 0.4 %
LDLC SERPL CALC-MCNC: 96 MG/DL
LYMPHOCYTES # BLD AUTO: 1.54 K/UL
LYMPHOCYTES NFR BLD AUTO: 31 %
MAN DIFF?: NORMAL
MCHC RBC-ENTMCNC: 30 PG
MCHC RBC-ENTMCNC: 33.7 GM/DL
MCV RBC AUTO: 89 FL
MONOCYTES # BLD AUTO: 0.55 K/UL
MONOCYTES NFR BLD AUTO: 11.1 %
NEUTROPHILS # BLD AUTO: 2.73 K/UL
NEUTROPHILS NFR BLD AUTO: 54.9 %
NONHDLC SERPL-MCNC: 118 MG/DL
PLATELET # BLD AUTO: 237 K/UL
POTASSIUM SERPL-SCNC: 3.6 MMOL/L
PROT SERPL-MCNC: 7.7 G/DL
PROT UR-MCNC: 24 MG/DL
PSA SERPL-MCNC: 3.64 NG/ML
RBC # BLD: 4.9 M/UL
RBC # FLD: 12.1 %
SODIUM SERPL-SCNC: 142 MMOL/L
TRIGL SERPL-MCNC: 108 MG/DL
WBC # FLD AUTO: 4.97 K/UL

## 2021-09-20 LAB
HCV AB SER QL: NONREACTIVE
HCV S/CO RATIO: 0.14 S/CO
HIV1+2 AB SPEC QL IA.RAPID: NONREACTIVE

## 2021-10-08 ENCOUNTER — RX RENEWAL (OUTPATIENT)
Age: 56
End: 2021-10-08

## 2021-11-05 ENCOUNTER — APPOINTMENT (OUTPATIENT)
Age: 56
End: 2021-11-05

## 2021-11-17 ENCOUNTER — APPOINTMENT (OUTPATIENT)
Dept: UROLOGY | Facility: CLINIC | Age: 56
End: 2021-11-17
Payer: MEDICAID

## 2021-11-17 PROCEDURE — 99213 OFFICE O/P EST LOW 20 MIN: CPT

## 2021-11-17 PROCEDURE — 76705 ECHO EXAM OF ABDOMEN: CPT

## 2021-11-21 NOTE — HISTORY OF PRESENT ILLNESS
[FreeTextEntry1] : 56 yo M presents with history of  severe left flank pain and vomiting x 1 day\par Some gross hematuria\par went to ER - left nephrolithiasis\par Passed some gritty sediment this week\par Pain resolved after\par Knew about kidney stones since 2008\par Drinks 1 bottle of water, 2 bottles of seltzer, no caffeine\par \par 6/4/21 Interval history: no issues since last visit\par No recurrence of vomiting or hematuria\par US done at last visit showed resolution of hydronephrosis\par \par 11/17/21 Interval history: no issues since last visit\par 24 hr urine collection in July showed low levels of citrate\par Pt has added conrad to his diet and declines potassium citrate at this time\par PSA is 3.6\par stable renal US today

## 2021-11-21 NOTE — ASSESSMENT
[FreeTextEntry1] : 57 yo M with nephrolithiasis\par \par - Reviewed 24 hr urine collection results. Reaffirmed diet and behavioral modifications for stone prevention\par - Discussed ultrasound results showing nonobstructive stone in right l ower pole. Decision made to continue observation at this time\par -  FU in 6 months

## 2021-11-21 NOTE — PHYSICAL EXAM
[General Appearance - Well Developed] : well developed [General Appearance - Well Nourished] : well nourished [Normal Appearance] : normal appearance [Well Groomed] : well groomed [General Appearance - In No Acute Distress] : no acute distress [Abdomen Soft] : soft [Abdomen Tenderness] : non-tender [Costovertebral Angle Tenderness] : no ~M costovertebral angle tenderness [Urethral Meatus] : meatus normal [Penis Abnormality] : normal circumcised penis [Urinary Bladder Findings] : the bladder was normal on palpation [Scrotum] : the scrotum was normal [Epididymis] : the epididymides were normal [Testes Tenderness] : no tenderness of the testes [Testes Mass (___cm)] : there were no testicular masses [Prostate Enlargement] : the prostate was not enlarged [Prostate Tenderness] : the prostate was not tender [No Prostate Nodules] : no prostate nodules [Edema] : no peripheral edema [] : no respiratory distress [Respiration, Rhythm And Depth] : normal respiratory rhythm and effort [Exaggerated Use Of Accessory Muscles For Inspiration] : no accessory muscle use [Oriented To Time, Place, And Person] : oriented to person, place, and time [Affect] : the affect was normal [Mood] : the mood was normal [Not Anxious] : not anxious [Normal Station and Gait] : the gait and station were normal for the patient's age [No Focal Deficits] : no focal deficits [No Palpable Adenopathy] : no palpable adenopathy [FreeTextEntry1] : right atrophic testes

## 2021-12-17 ENCOUNTER — APPOINTMENT (OUTPATIENT)
Dept: PHYSICAL MEDICINE AND REHAB | Facility: CLINIC | Age: 56
End: 2021-12-17
Payer: MEDICAID

## 2021-12-17 VITALS
OXYGEN SATURATION: 96 % | SYSTOLIC BLOOD PRESSURE: 150 MMHG | TEMPERATURE: 97.2 F | HEART RATE: 69 BPM | DIASTOLIC BLOOD PRESSURE: 88 MMHG

## 2021-12-17 PROCEDURE — 99213 OFFICE O/P EST LOW 20 MIN: CPT

## 2021-12-17 NOTE — PHYSICAL EXAM
[Normal] : Oriented to person, place, and time, insight and judgement were intact and the affect was normal [de-identified] : no resp distress [de-identified] : nml DPP [de-identified] : soft [de-identified] : Tender to palp paraspinals [de-identified] : slightly antalgic gait [de-identified] : Motor 5/5 bl UE and LEs, Sensation intact, SLR- LBP

## 2021-12-17 NOTE — HISTORY OF PRESENT ILLNESS
[FreeTextEntry1] : Patient reports pain in low back that is 3/10 in severity.\par Non-radiating.\par Started on Medrol- on day #3/ heating pad. \par No B/B incontinence. No weakness.

## 2021-12-17 NOTE — ASSESSMENT
[FreeTextEntry1] : - Referred for PT for core stabilization, modalities- US/Sup heat, stretch\par - Continue Medrol and then resume anti-flammatories\par - If no improvement will consider referral for JENNIFER\par - Discussed with patient red flags such as bladder/bowel incontinence, weakness, significant increase in pain level in which case patient should immediately present to Emergency Room.\par

## 2022-01-19 NOTE — END OF VISIT
[employer] for authorization of approval of ultrasound of the left shoulder cpt code 12706. S/w  Ramses Aaron. Authorization is not required. Reference # [id number]. Will inform Nursing. [>50% of Time Spent on Counseling and Coordination of Care for  ___] : Greater than 50% of the encounter time was spent on counseling and coordination of care for [unfilled] [Time Spent: ___ minutes] : I have spent [unfilled] minutes of face to face time with the patient

## 2022-03-07 ENCOUNTER — APPOINTMENT (OUTPATIENT)
Dept: INTERNAL MEDICINE | Facility: CLINIC | Age: 57
End: 2022-03-07

## 2022-03-10 ENCOUNTER — APPOINTMENT (OUTPATIENT)
Dept: INTERNAL MEDICINE | Facility: CLINIC | Age: 57
End: 2022-03-10
Payer: MEDICAID

## 2022-03-10 VITALS — SYSTOLIC BLOOD PRESSURE: 128 MMHG | DIASTOLIC BLOOD PRESSURE: 80 MMHG

## 2022-03-10 VITALS
SYSTOLIC BLOOD PRESSURE: 124 MMHG | TEMPERATURE: 97.1 F | OXYGEN SATURATION: 96 % | WEIGHT: 179 LBS | HEART RATE: 66 BPM | BODY MASS INDEX: 28.77 KG/M2 | DIASTOLIC BLOOD PRESSURE: 79 MMHG | HEIGHT: 66 IN

## 2022-03-10 DIAGNOSIS — M51.16 INTERVERTEBRAL DISC DISORDERS WITH RADICULOPATHY, LUMBAR REGION: ICD-10-CM

## 2022-03-10 DIAGNOSIS — M51.16 INTERVERTEBRAL DISC DISORDERS WITH RADICULOPATHY, LUMBAR REGION: Chronic | ICD-10-CM

## 2022-03-10 DIAGNOSIS — Z00.00 ENCOUNTER FOR GENERAL ADULT MEDICAL EXAMINATION W/OUT ABNORMAL FINDINGS: ICD-10-CM

## 2022-03-10 PROCEDURE — 99214 OFFICE O/P EST MOD 30 MIN: CPT

## 2022-03-10 RX ORDER — METHYLPREDNISOLONE 4 MG/1
4 TABLET ORAL
Qty: 1 | Refills: 1 | Status: COMPLETED | COMMUNITY
Start: 2021-12-13 | End: 2021-12-13

## 2022-03-10 NOTE — PHYSICAL EXAM
[No Acute Distress] : no acute distress [Well Nourished] : well nourished [Well Developed] : well developed [Well-Appearing] : well-appearing [Normal Sclera/Conjunctiva] : normal sclera/conjunctiva [PERRL] : pupils equal round and reactive to light [EOMI] : extraocular movements intact [Normal Outer Ear/Nose] : the outer ears and nose were normal in appearance [Normal Oropharynx] : the oropharynx was normal [No JVD] : no jugular venous distention [No Lymphadenopathy] : no lymphadenopathy [Supple] : supple [Thyroid Normal, No Nodules] : the thyroid was normal and there were no nodules present [No Respiratory Distress] : no respiratory distress  [No Accessory Muscle Use] : no accessory muscle use [Clear to Auscultation] : lungs were clear to auscultation bilaterally [Normal Rate] : normal rate  [Regular Rhythm] : with a regular rhythm [Normal S1, S2] : normal S1 and S2 [No Murmur] : no murmur heard [No Carotid Bruits] : no carotid bruits [No Abdominal Bruit] : a ~M bruit was not heard ~T in the abdomen [No Varicosities] : no varicosities [Pedal Pulses Present] : the pedal pulses are present [No Edema] : there was no peripheral edema [No Palpable Aorta] : no palpable aorta [No Extremity Clubbing/Cyanosis] : no extremity clubbing/cyanosis [Soft] : abdomen soft [Non Tender] : non-tender [Non-distended] : non-distended [No Masses] : no abdominal mass palpated [No HSM] : no HSM [Normal Bowel Sounds] : normal bowel sounds [Normal Posterior Cervical Nodes] : no posterior cervical lymphadenopathy [Normal Anterior Cervical Nodes] : no anterior cervical lymphadenopathy [No CVA Tenderness] : no CVA  tenderness [No Spinal Tenderness] : no spinal tenderness [No Joint Swelling] : no joint swelling [Grossly Normal Strength/Tone] : grossly normal strength/tone [No Rash] : no rash [Coordination Grossly Intact] : coordination grossly intact [No Focal Deficits] : no focal deficits [Normal Gait] : normal gait [Deep Tendon Reflexes (DTR)] : deep tendon reflexes were 2+ and symmetric [Normal Affect] : the affect was normal [Normal Insight/Judgement] : insight and judgment were intact [de-identified] : HEEL WALK 5/5 L5 test

## 2022-03-10 NOTE — HISTORY OF PRESENT ILLNESS
[FreeTextEntry1] : HTN [de-identified] : 55 yo B male  HTN\par \par LBP L4L5 moderate large disc extruion impingement LEFT L5  HEEL WALK NORMAL 5/5 L5\par No incotinence no saddle anesthesia no cauda equina\par \par Episodes 2017 and hosp ED April 2021\par \par no Kcitrate but H2O for KIDNEY STONES  Stone risk revealed low CITRATE\par \par HA felodipine   no HA 2.5mg amlodipine\par lisinopril HCTZ\par \par NO BACK PAIN TODAY\par \par need to restart vit D 5000 IU weekly\par \par booster FALL 2021

## 2022-05-18 ENCOUNTER — APPOINTMENT (OUTPATIENT)
Age: 57
End: 2022-05-18
Payer: MEDICAID

## 2022-05-18 PROCEDURE — 99213 OFFICE O/P EST LOW 20 MIN: CPT

## 2022-05-18 NOTE — ASSESSMENT
[FreeTextEntry1] : 55 yo M with nephrolithiasis\par \par - PVR = 50ml\par - Reaffirmed behavioral and diet modifications for nephrolithiasis prevention\par - renal US in 6 months

## 2022-05-18 NOTE — HISTORY OF PRESENT ILLNESS
[FreeTextEntry1] : 54 yo M presents with history of  severe left flank pain and vomiting x 1 day\par Some gross hematuria\par went to ER - left nephrolithiasis\par Passed some gritty sediment this week\par Pain resolved after\par Knew about kidney stones since 2008\par Drinks 1 bottle of water, 2 bottles of seltzer, no caffeine\par \par 6/4/21 Interval history: no issues since last visit\par No recurrence of vomiting or hematuria\par US done at last visit showed resolution of hydronephrosis\par \par 11/17/21 Interval history: no issues since last visit\par 24 hr urine collection in July showed low levels of citrate\par Pt has added conrad to his diet and declines potassium citrate at this time\par PSA is 3.6\par stable renal US today\par \par 5/18/22 Interval history: No issues since last visit\par Eating conrad on their own daily\par Drinking about 1L of water, \par no flank pain or gross hematuria\par no significant LUTS

## 2022-06-09 ENCOUNTER — EMERGENCY (EMERGENCY)
Facility: HOSPITAL | Age: 57
LOS: 1 days | Discharge: HOME | End: 2022-06-09
Attending: EMERGENCY MEDICINE
Payer: MEDICAID

## 2022-06-09 VITALS
SYSTOLIC BLOOD PRESSURE: 155 MMHG | HEART RATE: 70 BPM | OXYGEN SATURATION: 97 % | RESPIRATION RATE: 18 BRPM | DIASTOLIC BLOOD PRESSURE: 99 MMHG | TEMPERATURE: 98 F

## 2022-06-09 VITALS
SYSTOLIC BLOOD PRESSURE: 132 MMHG | WEIGHT: 164.91 LBS | TEMPERATURE: 98 F | RESPIRATION RATE: 19 BRPM | OXYGEN SATURATION: 97 % | DIASTOLIC BLOOD PRESSURE: 87 MMHG | HEART RATE: 66 BPM | HEIGHT: 67 IN

## 2022-06-09 LAB
ALBUMIN SERPL ELPH-MCNC: 4.4 G/DL — SIGNIFICANT CHANGE UP (ref 3.3–5)
ALP SERPL-CCNC: 73 U/L — SIGNIFICANT CHANGE UP (ref 40–120)
ALT FLD-CCNC: 28 U/L — SIGNIFICANT CHANGE UP (ref 10–45)
ANION GAP SERPL CALC-SCNC: 11 MMOL/L — SIGNIFICANT CHANGE UP (ref 5–17)
APPEARANCE UR: CLEAR — SIGNIFICANT CHANGE UP
AST SERPL-CCNC: 22 U/L — SIGNIFICANT CHANGE UP (ref 10–40)
BACTERIA # UR AUTO: NEGATIVE — SIGNIFICANT CHANGE UP
BASOPHILS # BLD AUTO: 0.02 K/UL — SIGNIFICANT CHANGE UP (ref 0–0.2)
BASOPHILS NFR BLD AUTO: 0.4 % — SIGNIFICANT CHANGE UP (ref 0–2)
BILIRUB SERPL-MCNC: 0.4 MG/DL — SIGNIFICANT CHANGE UP (ref 0.2–1.2)
BILIRUB UR-MCNC: NEGATIVE — SIGNIFICANT CHANGE UP
BUN SERPL-MCNC: 15 MG/DL — SIGNIFICANT CHANGE UP (ref 7–23)
CALCIUM SERPL-MCNC: 9.6 MG/DL — SIGNIFICANT CHANGE UP (ref 8.4–10.5)
CHLORIDE SERPL-SCNC: 102 MMOL/L — SIGNIFICANT CHANGE UP (ref 96–108)
CO2 SERPL-SCNC: 25 MMOL/L — SIGNIFICANT CHANGE UP (ref 22–31)
COLOR SPEC: SIGNIFICANT CHANGE UP
CREAT SERPL-MCNC: 1.03 MG/DL — SIGNIFICANT CHANGE UP (ref 0.5–1.3)
DIFF PNL FLD: NEGATIVE — SIGNIFICANT CHANGE UP
EGFR: 84 ML/MIN/1.73M2 — SIGNIFICANT CHANGE UP
EOSINOPHIL # BLD AUTO: 0.1 K/UL — SIGNIFICANT CHANGE UP (ref 0–0.5)
EOSINOPHIL NFR BLD AUTO: 1.8 % — SIGNIFICANT CHANGE UP (ref 0–6)
EPI CELLS # UR: 0 /HPF — SIGNIFICANT CHANGE UP
GLUCOSE SERPL-MCNC: 103 MG/DL — HIGH (ref 70–99)
GLUCOSE UR QL: NEGATIVE — SIGNIFICANT CHANGE UP
HCT VFR BLD CALC: 44.4 % — SIGNIFICANT CHANGE UP (ref 39–50)
HGB BLD-MCNC: 15.1 G/DL — SIGNIFICANT CHANGE UP (ref 13–17)
HYALINE CASTS # UR AUTO: 2 /LPF — SIGNIFICANT CHANGE UP (ref 0–2)
IMM GRANULOCYTES NFR BLD AUTO: 0.2 % — SIGNIFICANT CHANGE UP (ref 0–1.5)
KETONES UR-MCNC: NEGATIVE — SIGNIFICANT CHANGE UP
LEUKOCYTE ESTERASE UR-ACNC: NEGATIVE — SIGNIFICANT CHANGE UP
LIDOCAIN IGE QN: 20 U/L — SIGNIFICANT CHANGE UP (ref 7–60)
LYMPHOCYTES # BLD AUTO: 1.44 K/UL — SIGNIFICANT CHANGE UP (ref 1–3.3)
LYMPHOCYTES # BLD AUTO: 25.4 % — SIGNIFICANT CHANGE UP (ref 13–44)
MCHC RBC-ENTMCNC: 29.6 PG — SIGNIFICANT CHANGE UP (ref 27–34)
MCHC RBC-ENTMCNC: 34 GM/DL — SIGNIFICANT CHANGE UP (ref 32–36)
MCV RBC AUTO: 87.1 FL — SIGNIFICANT CHANGE UP (ref 80–100)
MONOCYTES # BLD AUTO: 0.55 K/UL — SIGNIFICANT CHANGE UP (ref 0–0.9)
MONOCYTES NFR BLD AUTO: 9.7 % — SIGNIFICANT CHANGE UP (ref 2–14)
NEUTROPHILS # BLD AUTO: 3.54 K/UL — SIGNIFICANT CHANGE UP (ref 1.8–7.4)
NEUTROPHILS NFR BLD AUTO: 62.5 % — SIGNIFICANT CHANGE UP (ref 43–77)
NITRITE UR-MCNC: NEGATIVE — SIGNIFICANT CHANGE UP
NRBC # BLD: 0 /100 WBCS — SIGNIFICANT CHANGE UP (ref 0–0)
PH UR: 6 — SIGNIFICANT CHANGE UP (ref 5–8)
PLATELET # BLD AUTO: 243 K/UL — SIGNIFICANT CHANGE UP (ref 150–400)
POTASSIUM SERPL-MCNC: 3.4 MMOL/L — LOW (ref 3.5–5.3)
POTASSIUM SERPL-SCNC: 3.4 MMOL/L — LOW (ref 3.5–5.3)
PROT SERPL-MCNC: 7.6 G/DL — SIGNIFICANT CHANGE UP (ref 6–8.3)
PROT UR-MCNC: NEGATIVE — SIGNIFICANT CHANGE UP
RBC # BLD: 5.1 M/UL — SIGNIFICANT CHANGE UP (ref 4.2–5.8)
RBC # FLD: 12.4 % — SIGNIFICANT CHANGE UP (ref 10.3–14.5)
RBC CASTS # UR COMP ASSIST: 1 /HPF — SIGNIFICANT CHANGE UP (ref 0–4)
SARS-COV-2 RNA SPEC QL NAA+PROBE: SIGNIFICANT CHANGE UP
SODIUM SERPL-SCNC: 138 MMOL/L — SIGNIFICANT CHANGE UP (ref 135–145)
SP GR SPEC: 1.01 — SIGNIFICANT CHANGE UP (ref 1.01–1.02)
UROBILINOGEN FLD QL: NEGATIVE — SIGNIFICANT CHANGE UP
WBC # BLD: 5.66 K/UL — SIGNIFICANT CHANGE UP (ref 3.8–10.5)
WBC # FLD AUTO: 5.66 K/UL — SIGNIFICANT CHANGE UP (ref 3.8–10.5)
WBC UR QL: 0 /HPF — SIGNIFICANT CHANGE UP (ref 0–5)

## 2022-06-09 PROCEDURE — 96374 THER/PROPH/DIAG INJ IV PUSH: CPT

## 2022-06-09 PROCEDURE — 83690 ASSAY OF LIPASE: CPT

## 2022-06-09 PROCEDURE — U0005: CPT

## 2022-06-09 PROCEDURE — 99284 EMERGENCY DEPT VISIT MOD MDM: CPT | Mod: 25

## 2022-06-09 PROCEDURE — 36415 COLL VENOUS BLD VENIPUNCTURE: CPT

## 2022-06-09 PROCEDURE — 80053 COMPREHEN METABOLIC PANEL: CPT

## 2022-06-09 PROCEDURE — 87086 URINE CULTURE/COLONY COUNT: CPT

## 2022-06-09 PROCEDURE — 81001 URINALYSIS AUTO W/SCOPE: CPT

## 2022-06-09 PROCEDURE — U0003: CPT

## 2022-06-09 PROCEDURE — 74176 CT ABD & PELVIS W/O CONTRAST: CPT | Mod: 26,MA

## 2022-06-09 PROCEDURE — 74176 CT ABD & PELVIS W/O CONTRAST: CPT | Mod: MA

## 2022-06-09 PROCEDURE — 85025 COMPLETE CBC W/AUTO DIFF WBC: CPT

## 2022-06-09 PROCEDURE — 99285 EMERGENCY DEPT VISIT HI MDM: CPT

## 2022-06-09 RX ORDER — SODIUM CHLORIDE 9 MG/ML
1000 INJECTION INTRAMUSCULAR; INTRAVENOUS; SUBCUTANEOUS ONCE
Refills: 0 | Status: COMPLETED | OUTPATIENT
Start: 2022-06-09 | End: 2022-06-09

## 2022-06-09 RX ORDER — KETOROLAC TROMETHAMINE 30 MG/ML
30 SYRINGE (ML) INJECTION ONCE
Refills: 0 | Status: DISCONTINUED | OUTPATIENT
Start: 2022-06-09 | End: 2022-06-09

## 2022-06-09 RX ORDER — LIDOCAINE 4 G/100G
1 CREAM TOPICAL ONCE
Refills: 0 | Status: COMPLETED | OUTPATIENT
Start: 2022-06-09 | End: 2022-06-09

## 2022-06-09 RX ORDER — ACETAMINOPHEN 500 MG
975 TABLET ORAL ONCE
Refills: 0 | Status: COMPLETED | OUTPATIENT
Start: 2022-06-09 | End: 2022-06-09

## 2022-06-09 RX ORDER — POTASSIUM CHLORIDE 20 MEQ
40 PACKET (EA) ORAL ONCE
Refills: 0 | Status: COMPLETED | OUTPATIENT
Start: 2022-06-09 | End: 2022-06-09

## 2022-06-09 RX ADMIN — SODIUM CHLORIDE 1000 MILLILITER(S): 9 INJECTION INTRAMUSCULAR; INTRAVENOUS; SUBCUTANEOUS at 16:48

## 2022-06-09 RX ADMIN — Medication 30 MILLIGRAM(S): at 18:48

## 2022-06-09 RX ADMIN — LIDOCAINE 1 PATCH: 4 CREAM TOPICAL at 18:58

## 2022-06-09 RX ADMIN — Medication 975 MILLIGRAM(S): at 18:57

## 2022-06-09 RX ADMIN — Medication 40 MILLIEQUIVALENT(S): at 18:58

## 2022-06-09 RX ADMIN — Medication 30 MILLIGRAM(S): at 16:48

## 2022-06-09 NOTE — ED PROVIDER NOTE - NSICDXPASTMEDICALHX_GEN_ALL_CORE_FT
PAST MEDICAL HISTORY:  Glaucoma     Herniated intervertebral disc of lumbar spine     High cholesterol     HTN (hypertension)

## 2022-06-09 NOTE — ED ADULT NURSE NOTE - OBJECTIVE STATEMENT
56 year old male pt presented to the ED co acute onset of left flank pain today, pt states he was on the bus when he felt a sharp pain to right flank , pt denies any urinary symptoms no burning, no blood, denies any fever no chills, abd soft non tender non distended lung field cta pt is ambulatory A&Ox3 states h/o kidney stone

## 2022-06-09 NOTE — ED PROVIDER NOTE - NSFOLLOWUPINSTRUCTIONS_ED_ALL_ED_FT
Please follow up with your primary care doctor within 1 week.  Please follow up with your spine doctor within 1 wee    *Bring all printed lab/test results to your appointment(s).*    Take acetaminophen 500-1000mg every 6 hrs as needed for pain. DO NOT EXCEED 4000mg DAILY.  Take ibuprofen 400-600mg every 6 hrs as needed for pain. Take with food    Continue all other home medications as prescribed    Return to the ED for worsening back pain despite taking medications, numbness, weakness, numbness in your groin/buttock area, fevers, or any other concerns. Please follow up with your primary care doctor within 1 week.  Please follow up with your spine doctor within 1 week    *Bring all printed lab/test results to your appointment(s).*  You were found to have renal cysts on the CT scan - please discuss with your primary care doctor to arrange repeat imaging.     Take acetaminophen 500-1000mg every 6 hrs as needed for pain. DO NOT EXCEED 4000mg DAILY.  Take ibuprofen 400-600mg every 6 hrs as needed for pain. Take with food    Continue all other home medications as prescribed    Return to the ED for worsening back pain despite taking medications, numbness, weakness, numbness in your groin/buttock area, fevers, or any other concerns.

## 2022-06-09 NOTE — ED PROVIDER NOTE - PATIENT PORTAL LINK FT
You can access the FollowMyHealth Patient Portal offered by [facility] by registering at the following website: [email or web address]. By joining [employer]’s FollowMyHealth portal, you will also be able to view your health information using other applications (apps) compatible with our system.

## 2022-06-09 NOTE — ED PROVIDER NOTE - ATTENDING APP SHARED VISIT CONTRIBUTION OF CARE
Patient is a 55 yo M with history of HTN, high cholesterol, history of lumbar herniated disc, glaucoma here for evaluation of acute onset right flank pain. Pain does not radiate to abdomen or to legs. Patient states he was just sitting at the bus stop and felt his right flank just started to spasm. Denies trauma, heavy lifting or bending. He states his back pain has been well controlled. He tried taking Gabapentin and did not have any relief. No nausea, vomiting. Patient reports he has a history of kidney stones. Denies difficulty urinating. No dysuria. No hematuria. No recent illnesses. Denies sick contacts.     pcp: Dr. Buck Bagley  spine: Dr. Martin    VS noted  Gen. no acute distress, uncomfortable  HEENT: EOMI, mmm  Lungs: CTAB/L no C/ W /R   CVS: RRR   Abd; Soft non tender, non distended, right CVA tenderness, no left CVA tenderness  Spine: No midline C-T-L spine tenderness  Ext: no edema  Skin: no rash  Neuro AAOx3, EHL 5/5 in bilateral LE, non focal clear speech  a/p: right flank pain - acute onset. This could be MSK such as a spasm vs nephrolithiasis. Plan to check labs, u/a, CT A/P.   - Maury TURPIN

## 2022-06-09 NOTE — ED PROVIDER NOTE - CLINICAL SUMMARY MEDICAL DECISION MAKING FREE TEXT BOX
Maury TURPIN: right flank pain - acute onset. This could be MSK such as a spasm vs nephrolithiasis. Plan to check labs, u/a, CT A/P.

## 2022-06-09 NOTE — ED PROVIDER NOTE - MUSCULOSKELETAL, MLM
Spine appears normal with no midline or paraspinal ttp, range of motion is not limited, no muscle or joint tenderness

## 2022-06-09 NOTE — ED PROVIDER NOTE - OBJECTIVE STATEMENT
56y male pmhx HTN, HLD, lumbar herniated disc p/w back pain. pt reports sudden onset right lower back pain that began while pt was sitting at the bus stop. pain is severe, aching, intermittent radiating to R flank. took gabapentin 300mg x1 with no relief. reports pain feels similar to his chronic back pain although that is typically left-sided, this is right-sided. denies preceding trauma/overexertion. has history of kidney stones, has never required intervention. denies recent spinal injections. denies urinary retention/incontinence, saddle anesthesia, numbness, tingling, weakness, abdominal pain, NV, urinary symptoms, CP, or SOB

## 2022-06-09 NOTE — ED PROVIDER NOTE - NS ED ATTENDING STATEMENT MOD
This was a shared visit with the GARY. I reviewed and verified the documentation and independently performed the documented:

## 2022-06-09 NOTE — ED PROVIDER NOTE - PROGRESS NOTE DETAILS
CT negative for stone or other acute pathology. pt reports pain is moderately improved from toradol, pt still appears slightly uncomfortable with movement. will give additional analgesia with tylenol/lido patch. pt giving urine sample at this time - Desmond Arana PA-C UA without acute pathology. pt appears more comfortable, reports pain improvement. ambulatory in ED without difficulty. Will dc with follow up. Discussed plan and return precautions with patient who understands and agrees. All questions answered. - Desmond Arana PA-C

## 2022-06-11 LAB
CULTURE RESULTS: SIGNIFICANT CHANGE UP
SPECIMEN SOURCE: SIGNIFICANT CHANGE UP

## 2022-06-20 ENCOUNTER — APPOINTMENT (OUTPATIENT)
Dept: PHYSICAL MEDICINE AND REHAB | Facility: CLINIC | Age: 57
End: 2022-06-20
Payer: MEDICAID

## 2022-06-20 VITALS
SYSTOLIC BLOOD PRESSURE: 138 MMHG | HEART RATE: 73 BPM | DIASTOLIC BLOOD PRESSURE: 91 MMHG | OXYGEN SATURATION: 98 % | TEMPERATURE: 98.6 F

## 2022-06-20 PROCEDURE — 99213 OFFICE O/P EST LOW 20 MIN: CPT

## 2022-06-20 NOTE — ASSESSMENT
[FreeTextEntry1] : - Advised, since pain 1/10, does not need injection- should do core exercises 2-3/wk and stretch daily\par -- Provided script for flexeril 10mg prn severe pain. \par - Discussed with patient red flags such as bladder/bowel incontinence, weakness, significant increase in pain level in which case patient should immediately present to Emergency Room.\par

## 2022-06-20 NOTE — HISTORY OF PRESENT ILLNESS
[FreeTextEntry1] : Was doing well until 6/9/22- had sudden non-traumatic onset of LBP, when at bus stop went to get up.\par Had CT which noted renal cyst.\par Still with a little pain (1/10) of the low back.\par Pain does not radiate.\par Has not been using pain meds other than Ibuprofen prn.

## 2022-06-20 NOTE — PHYSICAL EXAM
[Normal] : Normal gait, no clubbing or cyanosis of the fingernails, no joint swelling seen and muscle strength and tone normal [de-identified] : no resp distress [de-identified] : nml DPP [de-identified] : soft [de-identified] : Tender to palp paraspinals [de-identified] : mild tender to palp lumbar paraspinals.  [de-identified] : Motor 5/5 bl UE and LEs, Sensation intact, SLR- Negative

## 2022-07-19 ENCOUNTER — NON-APPOINTMENT (OUTPATIENT)
Age: 57
End: 2022-07-19

## 2022-07-20 ENCOUNTER — TRANSCRIPTION ENCOUNTER (OUTPATIENT)
Age: 57
End: 2022-07-20

## 2022-07-20 ENCOUNTER — NON-APPOINTMENT (OUTPATIENT)
Age: 57
End: 2022-07-20

## 2022-07-27 ENCOUNTER — NON-APPOINTMENT (OUTPATIENT)
Age: 57
End: 2022-07-27

## 2022-07-28 ENCOUNTER — NON-APPOINTMENT (OUTPATIENT)
Age: 57
End: 2022-07-28

## 2022-08-10 ENCOUNTER — RX RENEWAL (OUTPATIENT)
Age: 57
End: 2022-08-10

## 2022-09-09 LAB
25(OH)D3 SERPL-MCNC: 38.8 NG/ML
ALBUMIN SERPL ELPH-MCNC: 4.4 G/DL
ALP BLD-CCNC: 73 U/L
ALT SERPL-CCNC: 22 U/L
ANION GAP SERPL CALC-SCNC: 13 MMOL/L
AST SERPL-CCNC: 22 U/L
BASOPHILS # BLD AUTO: 0.01 K/UL
BASOPHILS NFR BLD AUTO: 0.2 %
BILIRUB SERPL-MCNC: 0.4 MG/DL
BUN SERPL-MCNC: 14 MG/DL
CALCIUM SERPL-MCNC: 9.4 MG/DL
CHLORIDE SERPL-SCNC: 105 MMOL/L
CHOLEST SERPL-MCNC: 146 MG/DL
CO2 SERPL-SCNC: 24 MMOL/L
CREAT SERPL-MCNC: 1.07 MG/DL
EGFR: 80 ML/MIN/1.73M2
EOSINOPHIL # BLD AUTO: 0.12 K/UL
EOSINOPHIL NFR BLD AUTO: 2.5 %
ESTIMATED AVERAGE GLUCOSE: 123 MG/DL
GLUCOSE SERPL-MCNC: 91 MG/DL
HBA1C MFR BLD HPLC: 5.9 %
HCT VFR BLD CALC: 40.8 %
HDLC SERPL-MCNC: 46 MG/DL
HGB BLD-MCNC: 14 G/DL
IMM GRANULOCYTES NFR BLD AUTO: 0.2 %
LDLC SERPL CALC-MCNC: 88 MG/DL
LYMPHOCYTES # BLD AUTO: 1.46 K/UL
LYMPHOCYTES NFR BLD AUTO: 30.7 %
MAN DIFF?: NORMAL
MCHC RBC-ENTMCNC: 29.7 PG
MCHC RBC-ENTMCNC: 34.3 GM/DL
MCV RBC AUTO: 86.6 FL
MONOCYTES # BLD AUTO: 0.59 K/UL
MONOCYTES NFR BLD AUTO: 12.4 %
NEUTROPHILS # BLD AUTO: 2.57 K/UL
NEUTROPHILS NFR BLD AUTO: 54 %
NONHDLC SERPL-MCNC: 101 MG/DL
PLATELET # BLD AUTO: 216 K/UL
POTASSIUM SERPL-SCNC: 3.7 MMOL/L
PROT SERPL-MCNC: 6.9 G/DL
PSA SERPL-MCNC: 3.34 NG/ML
RBC # BLD: 4.71 M/UL
RBC # FLD: 13.1 %
SODIUM SERPL-SCNC: 142 MMOL/L
TRIGL SERPL-MCNC: 63 MG/DL
WBC # FLD AUTO: 4.76 K/UL

## 2022-09-12 ENCOUNTER — APPOINTMENT (OUTPATIENT)
Dept: INTERNAL MEDICINE | Facility: CLINIC | Age: 57
End: 2022-09-12

## 2022-09-12 VITALS — DIASTOLIC BLOOD PRESSURE: 78 MMHG | SYSTOLIC BLOOD PRESSURE: 144 MMHG

## 2022-09-12 VITALS — WEIGHT: 176 LBS | HEIGHT: 66.5 IN | BODY MASS INDEX: 27.95 KG/M2

## 2022-09-12 VITALS — DIASTOLIC BLOOD PRESSURE: 78 MMHG | SYSTOLIC BLOOD PRESSURE: 126 MMHG

## 2022-09-12 PROCEDURE — 99396 PREV VISIT EST AGE 40-64: CPT

## 2022-09-12 RX ORDER — TAMSULOSIN HYDROCHLORIDE 0.4 MG/1
0.4 CAPSULE ORAL
Qty: 21 | Refills: 0 | Status: COMPLETED | COMMUNITY
Start: 2022-06-13 | End: 2022-09-12

## 2022-09-12 NOTE — PHYSICAL EXAM
[No Acute Distress] : no acute distress [Well Nourished] : well nourished [Well Developed] : well developed [Well-Appearing] : well-appearing [Normal Sclera/Conjunctiva] : normal sclera/conjunctiva [PERRL] : pupils equal round and reactive to light [EOMI] : extraocular movements intact [Normal Outer Ear/Nose] : the outer ears and nose were normal in appearance [Normal Oropharynx] : the oropharynx was normal [No JVD] : no jugular venous distention [No Lymphadenopathy] : no lymphadenopathy [Supple] : supple [Thyroid Normal, No Nodules] : the thyroid was normal and there were no nodules present [No Respiratory Distress] : no respiratory distress  [No Accessory Muscle Use] : no accessory muscle use [Clear to Auscultation] : lungs were clear to auscultation bilaterally [Normal Rate] : normal rate  [Regular Rhythm] : with a regular rhythm [Normal S1, S2] : normal S1 and S2 [No Murmur] : no murmur heard [No Carotid Bruits] : no carotid bruits [No Abdominal Bruit] : a ~M bruit was not heard ~T in the abdomen [No Varicosities] : no varicosities [Pedal Pulses Present] : the pedal pulses are present [No Edema] : there was no peripheral edema [No Palpable Aorta] : no palpable aorta [No Extremity Clubbing/Cyanosis] : no extremity clubbing/cyanosis [Soft] : abdomen soft [Non Tender] : non-tender [Non-distended] : non-distended [No Masses] : no abdominal mass palpated [No HSM] : no HSM [Normal Bowel Sounds] : normal bowel sounds [Normal Posterior Cervical Nodes] : no posterior cervical lymphadenopathy [Normal Anterior Cervical Nodes] : no anterior cervical lymphadenopathy [No CVA Tenderness] : no CVA  tenderness [No Spinal Tenderness] : no spinal tenderness [No Joint Swelling] : no joint swelling [Grossly Normal Strength/Tone] : grossly normal strength/tone [No Rash] : no rash [Coordination Grossly Intact] : coordination grossly intact [No Focal Deficits] : no focal deficits [Normal Gait] : normal gait [Deep Tendon Reflexes (DTR)] : deep tendon reflexes were 2+ and symmetric [Normal Affect] : the affect was normal [Normal Insight/Judgement] : insight and judgment were intact [de-identified] : no weakness heel nor toe walk

## 2022-09-12 NOTE — HISTORY OF PRESENT ILLNESS
[de-identified] : 58 yo lumbar radiculopathy  NERVE BLOCK DR HOOK x 4\par Working part time  [employer]  \par Covering PT   PT x 6-8 sessions\par \par Kidney stones  no hematuria  nothing since Nov 2021\par Eye F/U GLAUCOMA CATARACT\par October FLU

## 2022-10-05 ENCOUNTER — MED ADMIN CHARGE (OUTPATIENT)
Age: 57
End: 2022-10-05

## 2022-10-05 ENCOUNTER — APPOINTMENT (OUTPATIENT)
Dept: INTERNAL MEDICINE | Facility: CLINIC | Age: 57
End: 2022-10-05

## 2022-10-05 PROCEDURE — G0008: CPT

## 2022-10-05 PROCEDURE — 90686 IIV4 VACC NO PRSV 0.5 ML IM: CPT

## 2022-11-02 ENCOUNTER — NON-APPOINTMENT (OUTPATIENT)
Age: 57
End: 2022-11-02

## 2022-11-02 ENCOUNTER — APPOINTMENT (OUTPATIENT)
Dept: GASTROENTEROLOGY | Facility: CLINIC | Age: 57
End: 2022-11-02

## 2022-11-02 VITALS
SYSTOLIC BLOOD PRESSURE: 125 MMHG | TEMPERATURE: 98 F | OXYGEN SATURATION: 99 % | HEIGHT: 66 IN | DIASTOLIC BLOOD PRESSURE: 86 MMHG | WEIGHT: 165 LBS | BODY MASS INDEX: 26.52 KG/M2 | HEART RATE: 75 BPM

## 2022-11-02 DIAGNOSIS — Z12.11 ENCOUNTER FOR SCREENING FOR MALIGNANT NEOPLASM OF COLON: ICD-10-CM

## 2022-11-02 PROCEDURE — 99203 OFFICE O/P NEW LOW 30 MIN: CPT

## 2022-11-02 RX ORDER — GABAPENTIN 300 MG/1
300 CAPSULE ORAL
Qty: 30 | Refills: 2 | Status: COMPLETED | COMMUNITY
Start: 2021-03-16 | End: 2022-11-02

## 2022-11-02 RX ORDER — CYCLOBENZAPRINE HYDROCHLORIDE 10 MG/1
10 TABLET, FILM COATED ORAL
Qty: 30 | Refills: 1 | Status: COMPLETED | COMMUNITY
Start: 2022-06-20 | End: 2022-11-02

## 2022-11-02 RX ORDER — MELOXICAM 15 MG/1
15 TABLET ORAL
Qty: 30 | Refills: 11 | Status: COMPLETED | COMMUNITY
Start: 2018-06-04 | End: 2022-11-02

## 2022-11-02 NOTE — PHYSICAL EXAM
[Alert] : alert [Normal Voice/Communication] : normal voice/communication [Sclera] : the sclera and conjunctiva were normal [Hearing Threshold Finger Rub Not [place]] : hearing was normal [Normal Appearance] : the appearance of the neck was normal [No Respiratory Distress] : no respiratory distress [No Acc Muscle Use] : no accessory muscle use [Respiration, Rhythm And Depth] : normal respiratory rhythm and effort [Heart Rate And Rhythm] : heart rate was normal and rhythm regular [Normal S1, S2] : normal S1 and S2 [Murmurs] : no murmurs [Bowel Sounds] : normal bowel sounds [Abdomen Tenderness] : non-tender [No Masses] : no abdominal mass palpated [Abdomen Soft] : soft [No Spinal Tenderness] : no spinal tenderness [Abnormal Walk] : normal gait [Normal Color / Pigmentation] : normal skin color and pigmentation [No Focal Deficits] : no focal deficits [Oriented To Time, Place, And Person] : oriented to person, place, and time [Normal Affect] : the affect was normal

## 2022-11-02 NOTE — HISTORY OF PRESENT ILLNESS
[FreeTextEntry1] : Colonoscopy 12/5/15: Very good prep. Internal hemorrhoids in rectum.\par Surveillance colonoscopy in 5 years

## 2022-11-02 NOTE — CONSULT LETTER
[Dear  ___] : Dear  [unfilled], [Consult Letter:] : I had the pleasure of evaluating your patient, [unfilled]. [Please see my note below.] : Please see my note below. [Consult Closing:] : Thank you very much for allowing me to participate in the care of this patient.  If you have any questions, please do not hesitate to contact me. [Sincerely,] : Sincerely, [FreeTextEntry2] : Dr. Buck Bagley [FreeTextEntry3] : Mj Mir MD\par [facility]\par  of Medicine, [employer] [employer] at [facility]\par Tel: [phone]\par Fax: [phone]\par

## 2022-11-02 NOTE — ASSESSMENT
[FreeTextEntry1] : Impression:\par 1. Colon cancer screening - due for screening now\par \par Plan:\par -The rationale for colon cancer screening, to find and remove pre-malignant polyps to prevent colon cancer was explained.\par -The alternatives to colonoscopy, including stool testing and virtual colonography for cancer screening was explained. The risks/benefits and limitations, including that of missed lesions associated with the alternatives and that colonoscopy is recommended if there is a positive result was explained. Explained that if he had polyps before, then he should have colonoscopy instead as stool testing is recommended only for average risk patients\par -Risks and benefits of colonoscopy including but not limited to bleeding, infection, missed lesions, perforation, injury to internal organs, anesthesia/drug side effects were discussed with patient. All questions answered. Patient is agreeable to the procedure.\par -Diet and bowel prep instructions explained to patient\par -Explained he needs a chaperone that day of procedure to go home with, and needs COVID test 3 days before the procedure

## 2022-11-18 ENCOUNTER — NON-APPOINTMENT (OUTPATIENT)
Age: 57
End: 2022-11-18

## 2022-11-21 ENCOUNTER — RESULT REVIEW (OUTPATIENT)
Age: 57
End: 2022-11-21

## 2022-11-21 ENCOUNTER — TRANSCRIPTION ENCOUNTER (OUTPATIENT)
Age: 57
End: 2022-11-21

## 2022-11-21 ENCOUNTER — APPOINTMENT (OUTPATIENT)
Dept: GASTROENTEROLOGY | Facility: HOSPITAL | Age: 57
End: 2022-11-21

## 2022-11-21 ENCOUNTER — OUTPATIENT (OUTPATIENT)
Dept: OUTPATIENT SERVICES | Facility: HOSPITAL | Age: 57
LOS: 1 days | End: 2022-11-21
Payer: MEDICAID

## 2022-11-21 VITALS
WEIGHT: 164.91 LBS | RESPIRATION RATE: 18 BRPM | DIASTOLIC BLOOD PRESSURE: 90 MMHG | HEART RATE: 87 BPM | SYSTOLIC BLOOD PRESSURE: 135 MMHG | TEMPERATURE: 98 F | OXYGEN SATURATION: 99 % | HEIGHT: 67 IN

## 2022-11-21 VITALS
SYSTOLIC BLOOD PRESSURE: 113 MMHG | OXYGEN SATURATION: 98 % | DIASTOLIC BLOOD PRESSURE: 77 MMHG | HEART RATE: 72 BPM | RESPIRATION RATE: 13 BRPM

## 2022-11-21 DIAGNOSIS — Z12.11 ENCOUNTER FOR SCREENING FOR MALIGNANT NEOPLASM OF COLON: ICD-10-CM

## 2022-11-21 LAB — SARS-COV-2 N GENE NPH QL NAA+PROBE: NOT DETECTED

## 2022-11-21 PROCEDURE — 45385 COLONOSCOPY W/LESION REMOVAL: CPT | Mod: PT

## 2022-11-21 PROCEDURE — C1889: CPT

## 2022-11-21 PROCEDURE — 88305 TISSUE EXAM BY PATHOLOGIST: CPT

## 2022-11-21 PROCEDURE — 45385 COLONOSCOPY W/LESION REMOVAL: CPT

## 2022-11-21 PROCEDURE — 88305 TISSUE EXAM BY PATHOLOGIST: CPT | Mod: 26

## 2022-11-21 DEVICE — CLIP RESOLUTION 360 235CM: Type: IMPLANTABLE DEVICE | Status: FUNCTIONAL

## 2022-11-21 DEVICE — NET RETRV ROT ROTH 2.5MMX230CM: Type: IMPLANTABLE DEVICE | Status: FUNCTIONAL

## 2022-11-21 RX ORDER — AMLODIPINE BESYLATE 2.5 MG/1
1 TABLET ORAL
Qty: 0 | Refills: 0 | DISCHARGE

## 2022-11-21 RX ORDER — ATORVASTATIN CALCIUM 80 MG/1
1 TABLET, FILM COATED ORAL
Qty: 0 | Refills: 0 | DISCHARGE

## 2022-11-21 RX ORDER — SODIUM CHLORIDE 9 MG/ML
500 INJECTION INTRAMUSCULAR; INTRAVENOUS; SUBCUTANEOUS
Refills: 0 | Status: COMPLETED | OUTPATIENT
Start: 2022-11-21 | End: 2022-11-21

## 2022-11-21 RX ADMIN — SODIUM CHLORIDE 30 MILLILITER(S): 9 INJECTION INTRAMUSCULAR; INTRAVENOUS; SUBCUTANEOUS at 10:27

## 2022-11-21 NOTE — ASU DISCHARGE PLAN (ADULT/PEDIATRIC) - NS MD DC FALL RISK RISK
For information on Fall & Injury Prevention, visit: [email or web address] OR  [email or web address] OR  https://www.cdc.gov/steadi/patient.html

## 2022-11-21 NOTE — PRE PROCEDURE NOTE - PRE PROCEDURE EVALUATION
Attending Physician: Giovani Mir MD    Procedure: Colonoscopy    Indication for Procedure: Screening  ________________________________________________________  PAST MEDICAL & SURGICAL HISTORY:  HTN (hypertension)      Glaucoma      High cholesterol      Herniated intervertebral disc of lumbar spine      No significant past surgical history        ALLERGIES:  No Known Allergies    HOME MEDICATIONS:  D3 1000 oral tablet: 1 tab(s) orally once a day  felodipine 5 mg oral tablet, extended release: 1 tab(s) orally once a day  hydrochlorothiazide 25 mg oral tablet: 1 tab(s) orally once a day  latanoprost 0.005% ophthalmic solution: 1 drop(s) to each affected eye once a day (in the evening)  lisinopril 10 mg oral tablet: 1 tab(s) orally once a day  potassium chloride 10 mEq oral capsule, extended release: 1 cap(s) orally 2 times a day    AICD/PPM: [ ] yes   [ x] no    PERTINENT LAB DATA:                      PHYSICAL EXAMINATION:    T(C): --  HR: --  BP: --  RR: --  SpO2: --    Constitutional: NAD  HEENT: PERRLA, EOMI,    Neck:  No JVD  Respiratory: CTAB/L  Cardiovascular: S1 and S2  Gastrointestinal: BS+, soft, NT/ND  Extremities: No peripheral edema  Neurological: A/O x 3, no focal deficits  Psychiatric: Normal mood, normal affect  Skin: No rashes    ASA Class: I [ ]  II [x ]  III [ ]  IV [ ]    COMMENTS:    The patient is a suitable candidate for the planned procedure unless box checked [ ]  No, explain:

## 2022-11-23 ENCOUNTER — APPOINTMENT (OUTPATIENT)
Age: 57
End: 2022-11-23

## 2022-11-23 LAB — SURGICAL PATHOLOGY STUDY: SIGNIFICANT CHANGE UP

## 2022-12-21 ENCOUNTER — APPOINTMENT (OUTPATIENT)
Dept: UROLOGY | Facility: CLINIC | Age: 57
End: 2022-12-21

## 2022-12-21 VITALS
TEMPERATURE: 97.3 F | OXYGEN SATURATION: 96 % | BODY MASS INDEX: 26.52 KG/M2 | HEIGHT: 66 IN | HEART RATE: 66 BPM | WEIGHT: 165 LBS | SYSTOLIC BLOOD PRESSURE: 142 MMHG | DIASTOLIC BLOOD PRESSURE: 89 MMHG | RESPIRATION RATE: 17 BRPM

## 2022-12-21 PROCEDURE — 76775 US EXAM ABDO BACK WALL LIM: CPT

## 2023-07-19 ENCOUNTER — APPOINTMENT (OUTPATIENT)
Dept: INTERNAL MEDICINE | Facility: CLINIC | Age: 58
End: 2023-07-19
Payer: MEDICAID

## 2023-07-19 VITALS
SYSTOLIC BLOOD PRESSURE: 120 MMHG | BODY MASS INDEX: 28.28 KG/M2 | DIASTOLIC BLOOD PRESSURE: 70 MMHG | HEART RATE: 78 BPM | WEIGHT: 176 LBS | OXYGEN SATURATION: 97 % | HEIGHT: 66 IN

## 2023-07-19 VITALS — SYSTOLIC BLOOD PRESSURE: 124 MMHG | DIASTOLIC BLOOD PRESSURE: 72 MMHG

## 2023-07-19 DIAGNOSIS — R19.7 DIARRHEA, UNSPECIFIED: ICD-10-CM

## 2023-07-19 PROCEDURE — 99213 OFFICE O/P EST LOW 20 MIN: CPT

## 2023-07-19 NOTE — HISTORY OF PRESENT ILLNESS
[FreeTextEntry1] : HTN [de-identified] : 57  lumbar radiculopathy  No weakness\par \par Diarrhea  no blood no fever  3 x daily loose stool From the store chicken sandwich  Now solid \par No diarrhea today  BRAT diet  No diarrjea tod

## 2023-09-20 ENCOUNTER — APPOINTMENT (OUTPATIENT)
Age: 58
End: 2023-09-20
Payer: MEDICAID

## 2023-09-20 ENCOUNTER — APPOINTMENT (OUTPATIENT)
Age: 58
End: 2023-09-20

## 2023-09-20 PROCEDURE — 76775 US EXAM ABDO BACK WALL LIM: CPT

## 2023-11-20 ENCOUNTER — NON-APPOINTMENT (OUTPATIENT)
Age: 58
End: 2023-11-20

## 2023-12-18 NOTE — HISTORY OF PRESENT ILLNESS
[FreeTextEntry1] : HTN  Back Pain [de-identified] : 52 yo HTN and lumbar DJD  cervical radiculopathy  nephrolithiasis\par \par Disability for lumbar mild thecal sac deformity L5 root\par Unable to lift objects  and works in [employer]. In the future will have scanned instead of lifting records. Will be out [id number] to [id number] to avoid lifting. \par Left ache throbbiing all night\par \par New L shoulder blad\par \par HA with 5mg felodipine  so 2.5mg amlodipine BID 3

## 2024-01-02 ENCOUNTER — APPOINTMENT (OUTPATIENT)
Dept: INTERNAL MEDICINE | Facility: CLINIC | Age: 59
End: 2024-01-02
Payer: MEDICAID

## 2024-01-02 ENCOUNTER — OUTPATIENT (OUTPATIENT)
Dept: OUTPATIENT SERVICES | Facility: HOSPITAL | Age: 59
LOS: 1 days | End: 2024-01-02
Payer: MEDICAID

## 2024-01-02 ENCOUNTER — NON-APPOINTMENT (OUTPATIENT)
Age: 59
End: 2024-01-02

## 2024-01-02 VITALS
DIASTOLIC BLOOD PRESSURE: 90 MMHG | BODY MASS INDEX: 27.32 KG/M2 | HEIGHT: 66 IN | SYSTOLIC BLOOD PRESSURE: 140 MMHG | WEIGHT: 170 LBS | OXYGEN SATURATION: 98 % | HEART RATE: 70 BPM

## 2024-01-02 VITALS — DIASTOLIC BLOOD PRESSURE: 86 MMHG | SYSTOLIC BLOOD PRESSURE: 130 MMHG

## 2024-01-02 VITALS — DIASTOLIC BLOOD PRESSURE: 84 MMHG | SYSTOLIC BLOOD PRESSURE: 132 MMHG

## 2024-01-02 DIAGNOSIS — M54.50 LOW BACK PAIN, UNSPECIFIED: ICD-10-CM

## 2024-01-02 DIAGNOSIS — N20.0 CALCULUS OF KIDNEY: ICD-10-CM

## 2024-01-02 DIAGNOSIS — R73.01 IMPAIRED FASTING GLUCOSE: ICD-10-CM

## 2024-01-02 DIAGNOSIS — Z12.5 ENCOUNTER FOR SCREENING FOR MALIGNANT NEOPLASM OF PROSTATE: ICD-10-CM

## 2024-01-02 DIAGNOSIS — I10 ESSENTIAL (PRIMARY) HYPERTENSION: ICD-10-CM

## 2024-01-02 DIAGNOSIS — R97.20 ELEVATED PROSTATE SPECIFIC ANTIGEN [PSA]: ICD-10-CM

## 2024-01-02 DIAGNOSIS — E55.9 VITAMIN D DEFICIENCY, UNSPECIFIED: ICD-10-CM

## 2024-01-02 DIAGNOSIS — M54.16 RADICULOPATHY, LUMBAR REGION: ICD-10-CM

## 2024-01-02 PROCEDURE — 99396 PREV VISIT EST AGE 40-64: CPT | Mod: 25

## 2024-01-02 PROCEDURE — G0463: CPT

## 2024-01-02 PROCEDURE — 93010 ELECTROCARDIOGRAM REPORT: CPT

## 2024-01-02 RX ORDER — CYCLOBENZAPRINE HYDROCHLORIDE 10 MG/1
10 TABLET, FILM COATED ORAL
Qty: 10 | Refills: 2 | Status: COMPLETED | COMMUNITY
Start: 2024-01-02 | End: 2024-02-01

## 2024-01-02 RX ORDER — AMLODIPINE BESYLATE 5 MG/1
5 TABLET ORAL DAILY
Qty: 90 | Refills: 3 | Status: ACTIVE | COMMUNITY
Start: 2019-02-04 | End: 1900-01-01

## 2024-01-02 RX ORDER — MELOXICAM 7.5 MG/1
7.5 TABLET ORAL
Qty: 30 | Refills: 1 | Status: COMPLETED | COMMUNITY
Start: 2024-01-02 | End: 2024-03-02

## 2024-01-02 RX ORDER — CHLORHEXIDINE GLUCONATE 4 %
1000 LIQUID (ML) TOPICAL
Qty: 90 | Refills: 3 | Status: ACTIVE | COMMUNITY
Start: 2024-01-02

## 2024-01-02 RX ORDER — LISINOPRIL 10 MG/1
10 TABLET ORAL
Qty: 90 | Refills: 3 | Status: ACTIVE | COMMUNITY
Start: 2017-03-16 | End: 1900-01-01

## 2024-01-02 RX ORDER — POLYETHYLENE GLYCOL 3350 AND ELECTROLYTES WITH LEMON FLAVOR 236; 22.74; 6.74; 5.86; 2.97 G/4L; G/4L; G/4L; G/4L; G/4L
236 POWDER, FOR SOLUTION ORAL
Qty: 1 | Refills: 0 | Status: COMPLETED | COMMUNITY
Start: 2022-11-02 | End: 2024-01-02

## 2024-01-03 LAB
25(OH)D3 SERPL-MCNC: 34.7 NG/ML
ALBUMIN SERPL ELPH-MCNC: 4.5 G/DL
ALP BLD-CCNC: 77 U/L
ALT SERPL-CCNC: 29 U/L
ANION GAP SERPL CALC-SCNC: 12 MMOL/L
AST SERPL-CCNC: 21 U/L
BASOPHILS # BLD AUTO: 0.04 K/UL
BASOPHILS NFR BLD AUTO: 0.9 %
BILIRUB SERPL-MCNC: 0.5 MG/DL
BUN SERPL-MCNC: 14 MG/DL
CALCIUM SERPL-MCNC: 10.2 MG/DL
CHLORIDE SERPL-SCNC: 101 MMOL/L
CHOLEST SERPL-MCNC: 177 MG/DL
CO2 SERPL-SCNC: 28 MMOL/L
CREAT SERPL-MCNC: 1.02 MG/DL
CREAT SPEC-SCNC: 95 MG/DL
CREAT SPEC-SCNC: 95 MG/DL
CREAT/PROT UR: 0.1 RATIO
EGFR: 84 ML/MIN/1.73M2
EOSINOPHIL # BLD AUTO: 0.13 K/UL
EOSINOPHIL NFR BLD AUTO: 2.9 %
ESTIMATED AVERAGE GLUCOSE: 114 MG/DL
GLUCOSE SERPL-MCNC: 88 MG/DL
HBA1C MFR BLD HPLC: 5.6 %
HCT VFR BLD CALC: 46.3 %
HDLC SERPL-MCNC: 53 MG/DL
HGB BLD-MCNC: 15.5 G/DL
IMM GRANULOCYTES NFR BLD AUTO: 0.4 %
LDLC SERPL CALC-MCNC: 111 MG/DL
LYMPHOCYTES # BLD AUTO: 1.4 K/UL
LYMPHOCYTES NFR BLD AUTO: 31 %
MAN DIFF?: NORMAL
MCHC RBC-ENTMCNC: 29.6 PG
MCHC RBC-ENTMCNC: 33.5 GM/DL
MCV RBC AUTO: 88.5 FL
MICROALBUMIN 24H UR DL<=1MG/L-MCNC: 5.1 MG/DL
MICROALBUMIN/CREAT 24H UR-RTO: 53 MG/G
MONOCYTES # BLD AUTO: 0.49 K/UL
MONOCYTES NFR BLD AUTO: 10.9 %
NEUTROPHILS # BLD AUTO: 2.43 K/UL
NEUTROPHILS NFR BLD AUTO: 53.9 %
NONHDLC SERPL-MCNC: 124 MG/DL
PLATELET # BLD AUTO: 241 K/UL
POTASSIUM SERPL-SCNC: 3.7 MMOL/L
PROT SERPL-MCNC: 7.7 G/DL
PROT UR-MCNC: 13 MG/DL
PSA SERPL-MCNC: 4.22 NG/ML
RBC # BLD: 5.23 M/UL
RBC # FLD: 13.1 %
SODIUM SERPL-SCNC: 141 MMOL/L
TRIGL SERPL-MCNC: 71 MG/DL
VIT B12 SERPL-MCNC: 585 PG/ML
WBC # FLD AUTO: 4.51 K/UL

## 2024-01-09 DIAGNOSIS — Z00.00 ENCOUNTER FOR GENERAL ADULT MEDICAL EXAMINATION WITHOUT ABNORMAL FINDINGS: ICD-10-CM

## 2024-01-24 ENCOUNTER — APPOINTMENT (OUTPATIENT)
Age: 59
End: 2024-01-24
Payer: MEDICAID

## 2024-01-24 VITALS
RESPIRATION RATE: 16 BRPM | BODY MASS INDEX: 27.32 KG/M2 | DIASTOLIC BLOOD PRESSURE: 95 MMHG | HEART RATE: 81 BPM | OXYGEN SATURATION: 98 % | TEMPERATURE: 97.3 F | HEIGHT: 66 IN | WEIGHT: 170 LBS | SYSTOLIC BLOOD PRESSURE: 146 MMHG

## 2024-01-24 PROCEDURE — 99214 OFFICE O/P EST MOD 30 MIN: CPT

## 2024-01-25 LAB
APPEARANCE: CLEAR
BACTERIA: NEGATIVE /HPF
BILIRUBIN URINE: NEGATIVE
BLOOD URINE: NEGATIVE
CAST: 0 /LPF
COLOR: YELLOW
EPITHELIAL CELLS: 0 /HPF
GLUCOSE QUALITATIVE U: NEGATIVE MG/DL
KETONES URINE: NEGATIVE MG/DL
LEUKOCYTE ESTERASE URINE: NEGATIVE
MICROSCOPIC-UA: NORMAL
NITRITE URINE: NEGATIVE
PH URINE: 5.5
PROTEIN URINE: 30 MG/DL
PSA SERPL-MCNC: 4 NG/ML
RED BLOOD CELLS URINE: 1 /HPF
SPECIFIC GRAVITY URINE: 1.02
UROBILINOGEN URINE: 0.2 MG/DL
WHITE BLOOD CELLS URINE: 1 /HPF

## 2024-01-28 LAB — BACTERIA UR CULT: NORMAL

## 2024-01-28 NOTE — ASSESSMENT
[FreeTextEntry1] : 59 yo M with elevated PSA  - PVR = 0ml - Reviewed labwork done by PCP and confirmed elevated PSA - UA, culture, repeat PSA - Discussed possible etiologies for elevated PSA including benign vs. malignancy - Discussed pros and cons of proceeding with a prostate needle biopsy. Discussed risk and benefits including infection, hematochezia, hematuria, hematospermia as well as specificity and sensitivity of the biopsy.  - Discussed the role of prostate MRI in the workup of elevated PSA as well - Repeat PSA today and if persistently elevated, will plan for prostate MRI to check for targetable lesions for possible fusion biopsy

## 2024-01-28 NOTE — HISTORY OF PRESENT ILLNESS
[FreeTextEntry1] : 56 yo M presents with history of  severe left flank pain and vomiting x 1 day Some gross hematuria went to ER - left nephrolithiasis Passed some gritty sediment this week Pain resolved after Knew about kidney stones since 2008 Drinks 1 bottle of water, 2 bottles of seltzer, no caffeine  6/4/21 Interval history: no issues since last visit No recurrence of vomiting or hematuria US done at last visit showed resolution of hydronephrosis  11/17/21 Interval history: no issues since last visit 24 hr urine collection in July showed low levels of citrate Pt has added conrad to his diet and declines potassium citrate at this time PSA is 3.6 stable renal US today  5/18/22 Interval history: No issues since last visit Eating conrad on their own daily Drinking about 1L of water,  no flank pain or gross hematuria no significant LUTS  1/24/24 Interval history: Was doing well since last visit with no significant LUTS Recent routine workup done by PCP - elevated PSA

## 2024-01-28 NOTE — PHYSICAL EXAM
[Normal Appearance] : normal appearance [Well Groomed] : well groomed [General Appearance - In No Acute Distress] : no acute distress [Edema] : no peripheral edema [Respiration, Rhythm And Depth] : normal respiratory rhythm and effort [Exaggerated Use Of Accessory Muscles For Inspiration] : no accessory muscle use [Abdomen Soft] : soft [Abdomen Tenderness] : non-tender [Costovertebral Angle Tenderness] : no ~M costovertebral angle tenderness [Urinary Bladder Findings] : the bladder was normal on palpation [Normal Station and Gait] : the gait and station were normal for the patient's age [No Focal Deficits] : no focal deficits [] : no rash [Oriented To Time, Place, And Person] : oriented to person, place, and time [Affect] : the affect was normal [Mood] : the mood was normal [No Palpable Adenopathy] : no palpable adenopathy

## 2024-02-03 ENCOUNTER — OUTPATIENT (OUTPATIENT)
Dept: OUTPATIENT SERVICES | Facility: HOSPITAL | Age: 59
LOS: 1 days | End: 2024-02-03
Payer: MEDICAID

## 2024-02-03 ENCOUNTER — RESULT REVIEW (OUTPATIENT)
Age: 59
End: 2024-02-03

## 2024-02-03 ENCOUNTER — APPOINTMENT (OUTPATIENT)
Dept: MRI IMAGING | Facility: CLINIC | Age: 59
End: 2024-02-03
Payer: MEDICAID

## 2024-02-03 DIAGNOSIS — R97.20 ELEVATED PROSTATE SPECIFIC ANTIGEN [PSA]: ICD-10-CM

## 2024-02-03 PROCEDURE — 76498 UNLISTED MR PROCEDURE: CPT

## 2024-02-03 PROCEDURE — A9585: CPT

## 2024-02-03 PROCEDURE — 72197 MRI PELVIS W/O & W/DYE: CPT

## 2024-02-03 PROCEDURE — 76498P: CUSTOM | Mod: 26

## 2024-02-03 PROCEDURE — 72197 MRI PELVIS W/O & W/DYE: CPT | Mod: 26

## 2024-03-19 ENCOUNTER — APPOINTMENT (OUTPATIENT)
Dept: INTERNAL MEDICINE | Facility: CLINIC | Age: 59
End: 2024-03-19
Payer: MEDICAID

## 2024-03-19 ENCOUNTER — OUTPATIENT (OUTPATIENT)
Dept: OUTPATIENT SERVICES | Facility: HOSPITAL | Age: 59
LOS: 1 days | End: 2024-03-19
Payer: MEDICAID

## 2024-03-19 VITALS
SYSTOLIC BLOOD PRESSURE: 130 MMHG | DIASTOLIC BLOOD PRESSURE: 80 MMHG | HEART RATE: 71 BPM | WEIGHT: 172 LBS | BODY MASS INDEX: 27.64 KG/M2 | HEIGHT: 66 IN | OXYGEN SATURATION: 98 %

## 2024-03-19 VITALS — DIASTOLIC BLOOD PRESSURE: 84 MMHG | SYSTOLIC BLOOD PRESSURE: 124 MMHG

## 2024-03-19 DIAGNOSIS — I10 ESSENTIAL (PRIMARY) HYPERTENSION: ICD-10-CM

## 2024-03-19 DIAGNOSIS — E78.5 HYPERLIPIDEMIA, UNSPECIFIED: ICD-10-CM

## 2024-03-19 DIAGNOSIS — R79.89 OTHER SPECIFIED ABNORMAL FINDINGS OF BLOOD CHEMISTRY: ICD-10-CM

## 2024-03-19 DIAGNOSIS — R97.20 ELEVATED PROSTATE, SPECIFIC ANTIGEN [PSA]: ICD-10-CM

## 2024-03-19 DIAGNOSIS — Z23 ENCOUNTER FOR IMMUNIZATION: ICD-10-CM

## 2024-03-19 DIAGNOSIS — N06.8 ISOLATED PROTEINURIA WITH OTHER MORPHOLOGIC LESION: ICD-10-CM

## 2024-03-19 DIAGNOSIS — R80.0 ISOLATED PROTEINURIA: ICD-10-CM

## 2024-03-19 DIAGNOSIS — N20.0 CALCULUS OF KIDNEY: ICD-10-CM

## 2024-03-19 PROCEDURE — 99213 OFFICE O/P EST LOW 20 MIN: CPT

## 2024-03-19 PROCEDURE — G0463: CPT

## 2024-03-19 NOTE — HISTORY OF PRESENT ILLNESS
[FreeTextEntry1] : CPE HTN [de-identified] : 59 yo B male PSA > 2.0 lumbar radiculopathy w severe pain S/P nerve block Dr Emerson x 4 relief nephrolithiasis   no hematuria since Nov 2021  LOW CITRATE glaucoma and cataract  tubular adenoma 2022  REPEAT 2029  7 YEAR FOLLOW UP  Used to work [employer]  now [employer] [place]   NO LIFTING  Last PAIN recently since December 14 2023. Now 2/10 intermittent R or L. NO radiculoapthy When lift L leg hurts PT   already  Doing exercise daily.  SH: not sexually active live alone   M passed 2004  cirrhosis hepatic CA     F passed 1985  stomach CA no TOB   social drink  NO heavy lifting   brother 1962  mild back issues  sister 1960  LBP, herniation, knee  youngest of 3 Proxy Nephew Freddie Melgar

## 2024-03-19 NOTE — REVIEW OF SYSTEMS
[Fever] : no fever [Chest Pain] : no chest pain [Palpitations] : no palpitations [Claudication] : no  leg claudication [Orthopena] : no orthopnea [Lower Ext Edema] : no lower extremity edema [Shortness Of Breath] : no shortness of breath [Dizziness] : no dizziness [Wheezing] : no wheezing [Anxiety] : no anxiety [Unsteady Walk] : no ataxia [Depression] : no depression [Easy Bleeding] : no easy bleeding [Easy Bruising] : no easy bruising [Swollen Glands] : no swollen glands

## 2024-03-19 NOTE — HEALTH RISK ASSESSMENT
[Good] : ~his/her~ current health as good [Excellent] : ~his/her~  mood as  excellent [No falls in past year] : Patient reported no falls in the past year [0] : 2) Feeling down, depressed, or hopeless: Not at all (0) [PHQ-2 Negative - No further assessment needed] : PHQ-2 Negative - No further assessment needed [Name: ___] : Health Care Proxy's Name: [unfilled]  [Relationship: ___] : Relationship: [unfilled] [[id number]] : 0

## 2024-03-19 NOTE — HISTORY OF PRESENT ILLNESS
[FreeTextEntry1] : HTN [de-identified] : 59 yo back pain  lumbar herniation  radiculopathy  S/P nerve block kidney stones 2022 tubuloadenoma  Now amlodipine 5mg from 2.5 BID for better BP control Working part time [employer]  NO LIFTING No back pain x 2 years  glaucoma and catraract ASCVD taking atorvastatin  MRI prostate   NO BX  PSA >3 drinking pomegranate juice

## 2024-03-19 NOTE — PHYSICAL EXAM
[No Acute Distress] : no acute distress [Well Nourished] : well nourished [Well-Appearing] : well-appearing [Well Developed] : well developed [Normal Sclera/Conjunctiva] : normal sclera/conjunctiva [EOMI] : extraocular movements intact [PERRL] : pupils equal round and reactive to light [Normal Oropharynx] : the oropharynx was normal [Normal Outer Ear/Nose] : the outer ears and nose were normal in appearance [No JVD] : no jugular venous distention [No Lymphadenopathy] : no lymphadenopathy [Supple] : supple [Thyroid Normal, No Nodules] : the thyroid was normal and there were no nodules present [No Respiratory Distress] : no respiratory distress  [Clear to Auscultation] : lungs were clear to auscultation bilaterally [No Accessory Muscle Use] : no accessory muscle use [Regular Rhythm] : with a regular rhythm [Normal Rate] : normal rate  [Normal S1, S2] : normal S1 and S2 [No Murmur] : no murmur heard [No Carotid Bruits] : no carotid bruits [No Varicosities] : no varicosities [No Abdominal Bruit] : a ~M bruit was not heard ~T in the abdomen [Pedal Pulses Present] : the pedal pulses are present [No Edema] : there was no peripheral edema [No Palpable Aorta] : no palpable aorta [No Extremity Clubbing/Cyanosis] : no extremity clubbing/cyanosis [Soft] : abdomen soft [Non Tender] : non-tender [Non-distended] : non-distended [No Masses] : no abdominal mass palpated [No HSM] : no HSM [Normal Bowel Sounds] : normal bowel sounds [Normal Posterior Cervical Nodes] : no posterior cervical lymphadenopathy [No CVA Tenderness] : no CVA  tenderness [Normal Anterior Cervical Nodes] : no anterior cervical lymphadenopathy [No Spinal Tenderness] : no spinal tenderness [No Joint Swelling] : no joint swelling [Grossly Normal Strength/Tone] : grossly normal strength/tone [No Rash] : no rash [Coordination Grossly Intact] : coordination grossly intact [Normal Gait] : normal gait [No Focal Deficits] : no focal deficits [Deep Tendon Reflexes (DTR)] : deep tendon reflexes were 2+ and symmetric [Normal Affect] : the affect was normal [Normal Insight/Judgement] : insight and judgment were intact

## 2024-03-19 NOTE — ASSESSMENT
[FreeTextEntry1] : 1) HTN   was on amlodipine 2.5mg BID increase to 5mg daily 130/90  130/86 RTO 6 weeks BP check  2)  PSA > 3.0  see Urology check PSA today 3) lumbar radiculopathy NEG ST LEG RAISE  No red flags No weakness  F/U PM&R

## 2024-03-22 ENCOUNTER — NON-APPOINTMENT (OUTPATIENT)
Age: 59
End: 2024-03-22

## 2024-03-22 LAB
CREAT SPEC-SCNC: 44 MG/DL
CREAT SPEC-SCNC: 44 MG/DL
CREAT/PROT UR: 0.2 RATIO
MICROALBUMIN 24H UR DL<=1MG/L-MCNC: 3.4 MG/DL
MICROALBUMIN/CREAT 24H UR-RTO: 78 MG/G
PROT UR-MCNC: 9 MG/DL

## 2024-03-25 DIAGNOSIS — R80.0 ISOLATED PROTEINURIA: ICD-10-CM

## 2024-03-25 DIAGNOSIS — N20.0 CALCULUS OF KIDNEY: ICD-10-CM

## 2024-03-25 DIAGNOSIS — R97.20 ELEVATED PROSTATE SPECIFIC ANTIGEN [PSA]: ICD-10-CM

## 2024-03-25 DIAGNOSIS — E78.5 HYPERLIPIDEMIA, UNSPECIFIED: ICD-10-CM

## 2024-03-25 DIAGNOSIS — R79.89 OTHER SPECIFIED ABNORMAL FINDINGS OF BLOOD CHEMISTRY: ICD-10-CM

## 2024-05-20 ENCOUNTER — APPOINTMENT (OUTPATIENT)
Dept: NEPHROLOGY | Facility: CLINIC | Age: 59
End: 2024-05-20
Payer: MEDICAID

## 2024-05-20 VITALS
BODY MASS INDEX: 27.32 KG/M2 | DIASTOLIC BLOOD PRESSURE: 80 MMHG | WEIGHT: 170 LBS | SYSTOLIC BLOOD PRESSURE: 140 MMHG | HEART RATE: 70 BPM | HEIGHT: 66 IN

## 2024-05-20 DIAGNOSIS — I10 ESSENTIAL (PRIMARY) HYPERTENSION: ICD-10-CM

## 2024-05-20 DIAGNOSIS — R80.8 OTHER PROTEINURIA: ICD-10-CM

## 2024-05-20 PROCEDURE — 99204 OFFICE O/P NEW MOD 45 MIN: CPT

## 2024-05-20 NOTE — PHYSICAL EXAM
[General Appearance - Alert] : alert [General Appearance - In No Acute Distress] : in no acute distress [General Appearance - Well Nourished] : well nourished [Sclera] : the sclera and conjunctiva were normal [Outer Ear] : the ears and nose were normal in appearance [Neck Appearance] : the appearance of the neck was normal [Respiration, Rhythm And Depth] : normal respiratory rhythm and effort [Auscultation Breath Sounds / Voice Sounds] : lungs were clear to auscultation bilaterally [Heart Rate And Rhythm] : heart rate was normal and rhythm regular [Heart Sounds] : normal S1 and S2 [Murmurs] : no murmurs [Edema] : there was no peripheral edema [Bowel Sounds] : normal bowel sounds [Abdomen Soft] : soft [Abdomen Tenderness] : non-tender [Abdomen Mass (___ Cm)] : no abdominal mass palpated [No CVA Tenderness] : no ~M costovertebral angle tenderness [Involuntary Movements] : no involuntary movements were seen [Skin Color & Pigmentation] : normal skin color and pigmentation [] : no rash [No Focal Deficits] : no focal deficits [Oriented To Time, Place, And Person] : oriented to person, place, and time [Affect] : the affect was normal [Mood] : the mood was normal

## 2024-05-21 LAB
APPEARANCE: CLEAR
BACTERIA: NEGATIVE /HPF
BILIRUBIN URINE: NEGATIVE
BLOOD URINE: NEGATIVE
CAST: 1 /LPF
COLOR: YELLOW
CREAT SPEC-SCNC: 118 MG/DL
EPITHELIAL CELLS: 0 /HPF
GLUCOSE QUALITATIVE U: NEGATIVE MG/DL
KETONES URINE: NEGATIVE MG/DL
LEUKOCYTE ESTERASE URINE: NEGATIVE
MICROALBUMIN 24H UR DL<=1MG/L-MCNC: 6.2 MG/DL
MICROALBUMIN/CREAT 24H UR-RTO: 52 MG/G
MICROSCOPIC-UA: NORMAL
NITRITE URINE: NEGATIVE
PH URINE: 6
PROTEIN URINE: NORMAL MG/DL
RED BLOOD CELLS URINE: 1 /HPF
SPECIFIC GRAVITY URINE: 1.02
UROBILINOGEN URINE: 0.2 MG/DL
WHITE BLOOD CELLS URINE: 0 /HPF

## 2024-09-25 ENCOUNTER — APPOINTMENT (OUTPATIENT)
Age: 59
End: 2024-09-25
Payer: MEDICAID

## 2024-09-25 DIAGNOSIS — N20.0 CALCULUS OF KIDNEY: ICD-10-CM

## 2024-09-25 PROCEDURE — 76775 US EXAM ABDO BACK WALL LIM: CPT

## 2024-12-02 ENCOUNTER — APPOINTMENT (OUTPATIENT)
Dept: NEPHROLOGY | Facility: CLINIC | Age: 59
End: 2024-12-02
Payer: MEDICAID

## 2024-12-02 VITALS
OXYGEN SATURATION: 97 % | SYSTOLIC BLOOD PRESSURE: 149 MMHG | WEIGHT: 179.88 LBS | DIASTOLIC BLOOD PRESSURE: 95 MMHG | HEART RATE: 71 BPM | HEIGHT: 66 IN | TEMPERATURE: 97.4 F | BODY MASS INDEX: 28.91 KG/M2

## 2024-12-02 VITALS — SYSTOLIC BLOOD PRESSURE: 130 MMHG | DIASTOLIC BLOOD PRESSURE: 80 MMHG

## 2024-12-02 DIAGNOSIS — R80.8 OTHER PROTEINURIA: ICD-10-CM

## 2024-12-02 DIAGNOSIS — I10 ESSENTIAL (PRIMARY) HYPERTENSION: ICD-10-CM

## 2024-12-02 PROCEDURE — 99213 OFFICE O/P EST LOW 20 MIN: CPT

## 2024-12-03 LAB
APPEARANCE: CLEAR
BACTERIA: NEGATIVE /HPF
BILIRUBIN URINE: NEGATIVE
BLOOD URINE: NEGATIVE
CAST: 0 /LPF
COLOR: YELLOW
CREAT SPEC-SCNC: 63 MG/DL
EPITHELIAL CELLS: 0 /HPF
GLUCOSE QUALITATIVE U: NEGATIVE MG/DL
KETONES URINE: NEGATIVE MG/DL
LEUKOCYTE ESTERASE URINE: NEGATIVE
MICROALBUMIN 24H UR DL<=1MG/L-MCNC: 4.9 MG/DL
MICROALBUMIN/CREAT 24H UR-RTO: 77 MG/G
MICROSCOPIC-UA: NORMAL
NITRITE URINE: NEGATIVE
PH URINE: 6
PROTEIN URINE: NEGATIVE MG/DL
RED BLOOD CELLS URINE: 0 /HPF
SPECIFIC GRAVITY URINE: 1.02
UROBILINOGEN URINE: 0.2 MG/DL
WHITE BLOOD CELLS URINE: 0 /HPF

## 2025-01-03 ENCOUNTER — OUTPATIENT (OUTPATIENT)
Dept: OUTPATIENT SERVICES | Facility: HOSPITAL | Age: 60
LOS: 1 days | End: 2025-01-03
Payer: MEDICAID

## 2025-01-03 ENCOUNTER — NON-APPOINTMENT (OUTPATIENT)
Age: 60
End: 2025-01-03

## 2025-01-03 ENCOUNTER — APPOINTMENT (OUTPATIENT)
Dept: INTERNAL MEDICINE | Facility: CLINIC | Age: 60
End: 2025-01-03
Payer: MEDICAID

## 2025-01-03 VITALS
DIASTOLIC BLOOD PRESSURE: 80 MMHG | HEIGHT: 66.75 IN | SYSTOLIC BLOOD PRESSURE: 130 MMHG | WEIGHT: 180 LBS | BODY MASS INDEX: 28.25 KG/M2 | HEART RATE: 71 BPM | OXYGEN SATURATION: 97 %

## 2025-01-03 VITALS — SYSTOLIC BLOOD PRESSURE: 134 MMHG | DIASTOLIC BLOOD PRESSURE: 82 MMHG

## 2025-01-03 DIAGNOSIS — I10 ESSENTIAL (PRIMARY) HYPERTENSION: ICD-10-CM

## 2025-01-03 DIAGNOSIS — R73.03 PREDIABETES.: ICD-10-CM

## 2025-01-03 DIAGNOSIS — N06.8 ISOLATED PROTEINURIA WITH OTHER MORPHOLOGIC LESION: ICD-10-CM

## 2025-01-03 DIAGNOSIS — E78.5 HYPERLIPIDEMIA, UNSPECIFIED: ICD-10-CM

## 2025-01-03 DIAGNOSIS — E55.9 VITAMIN D DEFICIENCY, UNSPECIFIED: ICD-10-CM

## 2025-01-03 PROCEDURE — 99396 PREV VISIT EST AGE 40-64: CPT | Mod: 25

## 2025-01-03 PROCEDURE — G0463: CPT

## 2025-01-03 PROCEDURE — 93010 ELECTROCARDIOGRAM REPORT: CPT

## 2025-01-06 LAB
ALBUMIN SERPL ELPH-MCNC: 4.4 G/DL
ALP BLD-CCNC: 85 U/L
ALT SERPL-CCNC: 33 U/L
ANION GAP SERPL CALC-SCNC: 11 MMOL/L
AST SERPL-CCNC: 24 U/L
BASOPHILS # BLD AUTO: 0.04 K/UL
BASOPHILS NFR BLD AUTO: 0.8 %
BILIRUB SERPL-MCNC: 0.4 MG/DL
BUN SERPL-MCNC: 14 MG/DL
CALCIUM SERPL-MCNC: 9.6 MG/DL
CHLORIDE SERPL-SCNC: 101 MMOL/L
CHOLEST SERPL-MCNC: 178 MG/DL
CO2 SERPL-SCNC: 29 MMOL/L
CREAT SERPL-MCNC: 1.1 MG/DL
CREAT SPEC-SCNC: 126 MG/DL
CREAT SPEC-SCNC: 126 MG/DL
CREAT/PROT UR: 0.1 RATIO
EGFR: 76 ML/MIN/1.73M2
EOSINOPHIL # BLD AUTO: 0.07 K/UL
EOSINOPHIL NFR BLD AUTO: 1.4 %
ESTIMATED AVERAGE GLUCOSE: 120 MG/DL
GLUCOSE SERPL-MCNC: 77 MG/DL
HBA1C MFR BLD HPLC: 5.8 %
HCT VFR BLD CALC: 46.4 %
HDLC SERPL-MCNC: 49 MG/DL
HGB BLD-MCNC: 15.5 G/DL
IMM GRANULOCYTES NFR BLD AUTO: 0.4 %
LDLC SERPL CALC-MCNC: 110 MG/DL
LYMPHOCYTES # BLD AUTO: 1.55 K/UL
LYMPHOCYTES NFR BLD AUTO: 31.2 %
MAN DIFF?: NORMAL
MCHC RBC-ENTMCNC: 29.9 PG
MCHC RBC-ENTMCNC: 33.4 G/DL
MCV RBC AUTO: 89.4 FL
MICROALBUMIN 24H UR DL<=1MG/L-MCNC: 5 MG/DL
MICROALBUMIN/CREAT 24H UR-RTO: 40 MG/G
MONOCYTES # BLD AUTO: 0.6 K/UL
MONOCYTES NFR BLD AUTO: 12.1 %
NEUTROPHILS # BLD AUTO: 2.69 K/UL
NEUTROPHILS NFR BLD AUTO: 54.1 %
NONHDLC SERPL-MCNC: 129 MG/DL
PLATELET # BLD AUTO: 217 K/UL
POTASSIUM SERPL-SCNC: 3.4 MMOL/L
PROT SERPL-MCNC: 7.7 G/DL
PROT UR-MCNC: 13 MG/DL
RBC # BLD: 5.19 M/UL
RBC # FLD: 12.8 %
SODIUM SERPL-SCNC: 141 MMOL/L
TRIGL SERPL-MCNC: 105 MG/DL
WBC # FLD AUTO: 4.97 K/UL

## 2025-01-10 DIAGNOSIS — R73.03 PREDIABETES: ICD-10-CM

## 2025-01-10 DIAGNOSIS — N06.8 ISOLATED PROTEINURIA WITH OTHER MORPHOLOGIC LESION: ICD-10-CM

## 2025-01-10 DIAGNOSIS — Z00.00 ENCOUNTER FOR GENERAL ADULT MEDICAL EXAMINATION WITHOUT ABNORMAL FINDINGS: ICD-10-CM

## 2025-01-10 DIAGNOSIS — E55.9 VITAMIN D DEFICIENCY, UNSPECIFIED: ICD-10-CM

## 2025-01-10 DIAGNOSIS — E78.5 HYPERLIPIDEMIA, UNSPECIFIED: ICD-10-CM

## 2025-03-26 ENCOUNTER — APPOINTMENT (OUTPATIENT)
Age: 60
End: 2025-03-26
Payer: MEDICAID

## 2025-03-26 ENCOUNTER — NON-APPOINTMENT (OUTPATIENT)
Age: 60
End: 2025-03-26

## 2025-03-26 DIAGNOSIS — N20.0 CALCULUS OF KIDNEY: ICD-10-CM

## 2025-03-26 DIAGNOSIS — R97.20 ELEVATED PROSTATE, SPECIFIC ANTIGEN [PSA]: ICD-10-CM

## 2025-03-26 PROCEDURE — 99214 OFFICE O/P EST MOD 30 MIN: CPT

## 2025-03-26 PROCEDURE — G2211 COMPLEX E/M VISIT ADD ON: CPT | Mod: NC

## 2025-03-28 LAB — PSA SERPL-MCNC: 4.7 NG/ML

## 2025-06-12 ENCOUNTER — EMERGENCY (EMERGENCY)
Facility: HOSPITAL | Age: 60
LOS: 1 days | End: 2025-06-12
Attending: EMERGENCY MEDICINE
Payer: MEDICAID

## 2025-06-12 VITALS
RESPIRATION RATE: 16 BRPM | HEIGHT: 66.5 IN | OXYGEN SATURATION: 96 % | HEART RATE: 85 BPM | WEIGHT: 164.91 LBS | TEMPERATURE: 98 F | DIASTOLIC BLOOD PRESSURE: 97 MMHG | SYSTOLIC BLOOD PRESSURE: 152 MMHG

## 2025-06-12 VITALS
DIASTOLIC BLOOD PRESSURE: 78 MMHG | RESPIRATION RATE: 18 BRPM | SYSTOLIC BLOOD PRESSURE: 117 MMHG | TEMPERATURE: 97 F | OXYGEN SATURATION: 97 % | HEART RATE: 54 BPM

## 2025-06-12 PROCEDURE — 99284 EMERGENCY DEPT VISIT MOD MDM: CPT

## 2025-06-12 PROCEDURE — 99283 EMERGENCY DEPT VISIT LOW MDM: CPT

## 2025-06-12 RX ORDER — IBUPROFEN 200 MG
600 TABLET ORAL ONCE
Refills: 0 | Status: COMPLETED | OUTPATIENT
Start: 2025-06-12 | End: 2025-06-12

## 2025-06-12 RX ORDER — ACETAMINOPHEN 500 MG/5ML
650 LIQUID (ML) ORAL ONCE
Refills: 0 | Status: COMPLETED | OUTPATIENT
Start: 2025-06-12 | End: 2025-06-12

## 2025-06-12 RX ORDER — LIDOCAINE HYDROCHLORIDE 20 MG/ML
1 JELLY TOPICAL ONCE
Refills: 0 | Status: COMPLETED | OUTPATIENT
Start: 2025-06-12 | End: 2025-06-12

## 2025-06-12 RX ADMIN — Medication 600 MILLIGRAM(S): at 12:24

## 2025-06-12 RX ADMIN — Medication 650 MILLIGRAM(S): at 12:24

## 2025-06-12 RX ADMIN — LIDOCAINE HYDROCHLORIDE 1 PATCH: 20 JELLY TOPICAL at 12:23

## 2025-07-07 ENCOUNTER — APPOINTMENT (OUTPATIENT)
Dept: INTERNAL MEDICINE | Facility: CLINIC | Age: 60
End: 2025-07-07
Payer: MEDICAID

## 2025-07-07 ENCOUNTER — OUTPATIENT (OUTPATIENT)
Dept: OUTPATIENT SERVICES | Facility: HOSPITAL | Age: 60
LOS: 1 days | End: 2025-07-07
Payer: MEDICAID

## 2025-07-07 VITALS
SYSTOLIC BLOOD PRESSURE: 144 MMHG | HEIGHT: 66.75 IN | OXYGEN SATURATION: 97 % | DIASTOLIC BLOOD PRESSURE: 94 MMHG | WEIGHT: 180 LBS | BODY MASS INDEX: 28.25 KG/M2 | HEART RATE: 80 BPM

## 2025-07-07 VITALS — DIASTOLIC BLOOD PRESSURE: 78 MMHG | SYSTOLIC BLOOD PRESSURE: 122 MMHG

## 2025-07-07 DIAGNOSIS — I10 ESSENTIAL (PRIMARY) HYPERTENSION: ICD-10-CM

## 2025-07-07 PROCEDURE — 99214 OFFICE O/P EST MOD 30 MIN: CPT

## 2025-07-07 PROCEDURE — G0463: CPT

## 2025-07-08 LAB
ESTIMATED AVERAGE GLUCOSE: 117 MG/DL
HBA1C MFR BLD HPLC: 5.7 %

## 2025-07-15 DIAGNOSIS — R73.01 IMPAIRED FASTING GLUCOSE: ICD-10-CM

## 2025-07-15 DIAGNOSIS — E78.5 HYPERLIPIDEMIA, UNSPECIFIED: ICD-10-CM

## (undated) DEVICE — SOL INJ NS 0.9% 500ML 2 PORT

## (undated) DEVICE — SUCTION YANKAUER NO CONTROL VENT

## (undated) DEVICE — TUBING SUCTION CONN 6FT STERILE

## (undated) DEVICE — SNARE CAPTIVATOR RND COLD STIFF 10X2.8MM

## (undated) DEVICE — CATH IV SAFE BC 22G X 1" (BLUE)

## (undated) DEVICE — TUBING IV SET GRAVITY 3Y 100" MACRO

## (undated) DEVICE — BRUSH COLONOSCOPY CYTOLOGY

## (undated) DEVICE — IRRIGATOR BIO SHIELD

## (undated) DEVICE — SENSOR O2 FINGER ADULT

## (undated) DEVICE — CLAMP BX HOT RAD JAW 3

## (undated) DEVICE — TUBING SUCTION 20FT

## (undated) DEVICE — FORCEP RADIAL JAW 4 JUMBO 2.8MM 3.2MM 240CM ORANGE DISP

## (undated) DEVICE — SYR LUER LOK 50CC

## (undated) DEVICE — PACK IV START WITH CHG

## (undated) DEVICE — FOLEY HOLDER STATLOCK 2 WAY ADULT

## (undated) DEVICE — POLY TRAP ETRAP

## (undated) DEVICE — BIOPSY FORCEP RADIAL JAW 4 STANDARD WITH NEEDLE

## (undated) DEVICE — CATH IV SAFE BC 20G X 1.16" (PINK)

## (undated) DEVICE — ELCTR GROUNDING PAD ADULT COVIDIEN